# Patient Record
Sex: FEMALE | Race: WHITE | Employment: UNEMPLOYED | URBAN - METROPOLITAN AREA
[De-identification: names, ages, dates, MRNs, and addresses within clinical notes are randomized per-mention and may not be internally consistent; named-entity substitution may affect disease eponyms.]

---
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
## 2021-01-05 RX ORDER — VALACYCLOVIR HYDROCHLORIDE 1 G/1
2000 TABLET, FILM COATED ORAL 2 TIMES DAILY
Qty: 4 TABLET | Refills: 0 | Status: SHIPPED | OUTPATIENT
Start: 2021-01-05 | End: 2021-05-06

## 2021-03-04 LAB
PHQ9 SCORE: 12
PHQ9 SCORE: 12

## 2021-04-20 LAB
PHQ9 SCORE: 9

## 2021-05-06 ENCOUNTER — OFFICE VISIT (OUTPATIENT)
Dept: FAMILY MEDICINE | Facility: CLINIC | Age: 21
End: 2021-05-06
Payer: COMMERCIAL

## 2021-05-06 VITALS
BODY MASS INDEX: 20.88 KG/M2 | WEIGHT: 133 LBS | SYSTOLIC BLOOD PRESSURE: 122 MMHG | DIASTOLIC BLOOD PRESSURE: 64 MMHG | TEMPERATURE: 98.7 F | HEIGHT: 67 IN | HEART RATE: 95 BPM | OXYGEN SATURATION: 98 %

## 2021-05-06 DIAGNOSIS — R10.84 ABDOMINAL PAIN, GENERALIZED: Primary | ICD-10-CM

## 2021-05-06 DIAGNOSIS — L91.0 KELOID SCAR: ICD-10-CM

## 2021-05-06 DIAGNOSIS — F32.1 MODERATE MAJOR DEPRESSION (H): ICD-10-CM

## 2021-05-06 DIAGNOSIS — B00.1 COLD SORE: ICD-10-CM

## 2021-05-06 PROCEDURE — 99214 OFFICE O/P EST MOD 30 MIN: CPT | Performed by: PHYSICIAN ASSISTANT

## 2021-05-06 RX ORDER — VALACYCLOVIR HYDROCHLORIDE 1 G/1
2000 TABLET, FILM COATED ORAL 2 TIMES DAILY
Qty: 4 TABLET | Refills: 3 | Status: SHIPPED | OUTPATIENT
Start: 2021-05-06 | End: 2021-11-02

## 2021-05-06 ASSESSMENT — MIFFLIN-ST. JEOR: SCORE: 1410.91

## 2021-05-06 ASSESSMENT — PAIN SCALES - GENERAL: PAINLEVEL: NO PAIN (0)

## 2021-05-06 NOTE — PATIENT INSTRUCTIONS
To schedule a consult to talk with the stomach doctors about your issues   - call [facility] [phone]

## 2021-05-06 NOTE — PROGRESS NOTES
"    Assessment & Plan     (R10.84) Abdominal pain, generalized  (primary encounter diagnosis)  Comment: referral to GI as this has been a persistent problem for patient with no relief  After conservative measures  Plan: GASTROENTEROLOGY ADULT REF CONSULT ONLY            (B00.1) Cold sore  Comment: would like refill on valtrex  Plan: valACYclovir (VALTREX) 1000 mg tablet            (L91.0) Keloid scar  Comment: no evidence of infection at the earring site but there has been a development of some scarring likely from previous infection  Plan: patient unable to take the earring out - suspect if she did, it would close quickly. Can try to gently massage the area but not sure there is much that can be done at this point in time    (F32.1) Moderate major depression (H)  Comment: worsened recently - per patient, no particular reason why. Denies suicidal ideation. Is followed at [facility] - has not been back in awhile  Plan: patient has appointment with her psychiatrist scheduled within the next 2 weeks      Tobacco Cessation:   reports that she has been smoking other. She has never used smokeless tobacco.    Return in about 3 months (around 8/6/2021) for With specialist.    GILBERTO [facility] MARITZA Newton is a 20 year old who presents for the following health issues  accompanied by her mother:    HPI     Abdominal/Flank Pain  Onset/Duration: 1 month  Description:   Character: Dull ache, \"tight feeling\"   Location: \"Her whole stomach\"   Radiation: None  Intensity: moderate  Progression of Symptoms:  same  Accompanying Signs & Symptoms:  Fever/Chills: no  Gas/Bloating: YES- gas   Nausea: YES  Vomitting: YES- when eating a lot of dairy   Diarrhea: YES- intermittent   Constipation: YES- intermittent   Dysuria or Hematuria: no  History:   Trauma: no  Previous similar pain: no  Previous tests done: none  Precipitating factors:   Does the pain change with:     Food: no    Bowel Movement: " "no    Urination: no   Other factors:  no  Therapies tried and outcome: Mirilax, lactacid   No LMP recorded. (Menstrual status: IUD).      -She has some infected piercing's on both of her ears.      Mood has been not as good lately  No specific triggers or stressful events that she can identify    Review of Systems   Constitutional, HEENT, cardiovascular, pulmonary, gi and gu systems are negative, except as otherwise noted.      Objective    /64   Pulse 95   Temp 98.7  F (37.1  C) (Tympanic)   Ht 1.702 m (5' 7\")   Wt 60.3 kg (133 lb)   SpO2 98%   BMI 20.83 kg/m    Body mass index is 20.83 kg/m .  Physical Exam   GENERAL: healthy, alert and no distress  EARS: piercing in upper ear lobe with thickened, keloid skin around opening. No erythema, no discharge or drainage noted  CV: regular rates and rhythm, normal S1 S2, no S3 or S4 and no murmur, click or rub  ABDOMEN: soft, nontender, without hepatosplenomegaly or masses and bowel sounds normal            "

## 2021-05-12 PROBLEM — F32.1 MODERATE MAJOR DEPRESSION (H): Status: ACTIVE | Noted: 2021-05-12

## 2021-06-09 ENCOUNTER — TRANSFERRED RECORDS (OUTPATIENT)
Dept: HEALTH INFORMATION MANAGEMENT | Facility: CLINIC | Age: 21
End: 2021-06-09
Payer: COMMERCIAL

## 2021-06-28 ENCOUNTER — OFFICE VISIT (OUTPATIENT)
Dept: FAMILY MEDICINE | Facility: CLINIC | Age: 21
End: 2021-06-28
Payer: COMMERCIAL

## 2021-06-28 VITALS
TEMPERATURE: 97.2 F | WEIGHT: 132 LBS | HEART RATE: 102 BPM | OXYGEN SATURATION: 99 % | DIASTOLIC BLOOD PRESSURE: 64 MMHG | HEIGHT: 67 IN | BODY MASS INDEX: 20.72 KG/M2 | SYSTOLIC BLOOD PRESSURE: 112 MMHG

## 2021-06-28 DIAGNOSIS — L91.0 KELOID SCAR: ICD-10-CM

## 2021-06-28 DIAGNOSIS — J34.2 DEVIATED NASAL SEPTUM: Primary | ICD-10-CM

## 2021-06-28 DIAGNOSIS — R06.83 SNORING: ICD-10-CM

## 2021-06-28 PROCEDURE — 99213 OFFICE O/P EST LOW 20 MIN: CPT | Performed by: PHYSICIAN ASSISTANT

## 2021-06-28 ASSESSMENT — PAIN SCALES - GENERAL: PAINLEVEL: NO PAIN (0)

## 2021-06-28 ASSESSMENT — MIFFLIN-ST. JEOR: SCORE: 1406.38
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
## 2021-07-05 ENCOUNTER — OFFICE VISIT (OUTPATIENT)
Dept: OTOLARYNGOLOGY | Facility: CLINIC | Age: 21
End: 2021-07-05
Payer: COMMERCIAL

## 2021-07-05 VITALS
DIASTOLIC BLOOD PRESSURE: 92 MMHG | OXYGEN SATURATION: 96 % | BODY MASS INDEX: 21.18 KG/M2 | HEART RATE: 112 BPM | HEIGHT: 66 IN | WEIGHT: 131.8 LBS | SYSTOLIC BLOOD PRESSURE: 149 MMHG

## 2021-07-05 DIAGNOSIS — J34.89 NASAL OBSTRUCTION: ICD-10-CM

## 2021-07-05 DIAGNOSIS — M95.0 ACQUIRED NASAL DEFORMITY: Primary | ICD-10-CM

## 2021-07-05 DIAGNOSIS — J34.2 ACQUIRED DEVIATED NASAL SEPTUM: ICD-10-CM

## 2021-07-05 PROCEDURE — 99203 OFFICE O/P NEW LOW 30 MIN: CPT | Performed by: OTOLARYNGOLOGY

## 2021-07-05 ASSESSMENT — MIFFLIN-ST. JEOR: SCORE: 1389.59

## 2021-07-29 ENCOUNTER — TRANSFERRED RECORDS (OUTPATIENT)
Dept: HEALTH INFORMATION MANAGEMENT | Facility: CLINIC | Age: 21
End: 2021-07-29

## 2021-08-11 ENCOUNTER — TRANSFERRED RECORDS (OUTPATIENT)
Dept: HEALTH INFORMATION MANAGEMENT | Facility: CLINIC | Age: 21
End: 2021-08-11

## 2021-08-11 ENCOUNTER — OFFICE VISIT (OUTPATIENT)
Dept: FAMILY MEDICINE | Facility: CLINIC | Age: 21
End: 2021-08-11
Payer: COMMERCIAL

## 2021-08-11 VITALS
RESPIRATION RATE: 16 BRPM | SYSTOLIC BLOOD PRESSURE: 120 MMHG | HEART RATE: 87 BPM | DIASTOLIC BLOOD PRESSURE: 76 MMHG | OXYGEN SATURATION: 97 %

## 2021-08-11 DIAGNOSIS — M79.10 MYALGIA: Primary | ICD-10-CM

## 2021-08-11 DIAGNOSIS — R53.83 FATIGUE, UNSPECIFIED TYPE: ICD-10-CM

## 2021-08-11 LAB
ALBUMIN SERPL-MCNC: 4.1 G/DL (ref 3.5–5)
ALP SERPL-CCNC: 114 U/L (ref 45–120)
ALT SERPL W P-5'-P-CCNC: 15 U/L (ref 0–45)
ANION GAP SERPL CALCULATED.3IONS-SCNC: 9 MMOL/L (ref 5–18)
AST SERPL W P-5'-P-CCNC: 20 U/L (ref 0–40)
BASOPHILS # BLD AUTO: 0 10E3/UL (ref 0–0.2)
BASOPHILS NFR BLD AUTO: 0 %
BILIRUB SERPL-MCNC: 0.4 MG/DL (ref 0–1)
BUN SERPL-MCNC: 14 MG/DL (ref 8–22)
CALCIUM SERPL-MCNC: 9.9 MG/DL (ref 8.5–10.5)
CHLORIDE BLD-SCNC: 104 MMOL/L (ref 98–107)
CO2 SERPL-SCNC: 29 MMOL/L (ref 22–31)
CREAT SERPL-MCNC: 0.94 MG/DL (ref 0.6–1.1)
EOSINOPHIL # BLD AUTO: 0 10E3/UL (ref 0–0.7)
EOSINOPHIL NFR BLD AUTO: 0 %
ERYTHROCYTE [DISTWIDTH] IN BLOOD BY AUTOMATED COUNT: 11.5 % (ref 10–15)
GFR SERPL CREATININE-BSD FRML MDRD: 88 ML/MIN/1.73M2
GLUCOSE BLD-MCNC: 101 MG/DL (ref 70–125)
HCT VFR BLD AUTO: 39.4 % (ref 35–47)
HGB BLD-MCNC: 14 G/DL (ref 11.7–15.7)
IMM GRANULOCYTES # BLD: 0 10E3/UL
IMM GRANULOCYTES NFR BLD: 0 %
LYMPHOCYTES # BLD AUTO: 1.6 10E3/UL (ref 0.8–5.3)
LYMPHOCYTES NFR BLD AUTO: 24 %
MCH RBC QN AUTO: 31.2 PG (ref 26.5–33)
MCHC RBC AUTO-ENTMCNC: 35.5 G/DL (ref 31.5–36.5)
MCV RBC AUTO: 88 FL (ref 78–100)
MONOCYTES # BLD AUTO: 0.5 10E3/UL (ref 0–1.3)
MONOCYTES NFR BLD AUTO: 8 %
MONOCYTES NFR BLD AUTO: NEGATIVE %
NEUTROPHILS # BLD AUTO: 4.4 10E3/UL (ref 1.6–8.3)
NEUTROPHILS NFR BLD AUTO: 67 %
PLATELET # BLD AUTO: 241 10E3/UL (ref 150–450)
POTASSIUM BLD-SCNC: 4.4 MMOL/L (ref 3.5–5)
PROT SERPL-MCNC: 6.7 G/DL (ref 6–8)
RBC # BLD AUTO: 4.49 10E6/UL (ref 3.8–5.2)
SODIUM SERPL-SCNC: 142 MMOL/L (ref 136–145)
TSH SERPL DL<=0.005 MIU/L-ACNC: 0.87 UIU/ML (ref 0.3–5)
WBC # BLD AUTO: 6.6 10E3/UL (ref 4–11)

## 2021-08-11 PROCEDURE — U0003 INFECTIOUS AGENT DETECTION BY NUCLEIC ACID (DNA OR RNA); SEVERE ACUTE RESPIRATORY SYNDROME CORONAVIRUS 2 (SARS-COV-2) (CORONAVIRUS DISEASE [COVID-19]), AMPLIFIED PROBE TECHNIQUE, MAKING USE OF HIGH THROUGHPUT TECHNOLOGIES AS DESCRIBED BY CMS-2020-01-R: HCPCS | Performed by: PHYSICIAN ASSISTANT

## 2021-08-11 PROCEDURE — 86618 LYME DISEASE ANTIBODY: CPT | Performed by: PHYSICIAN ASSISTANT

## 2021-08-11 PROCEDURE — 36415 COLL VENOUS BLD VENIPUNCTURE: CPT | Performed by: PHYSICIAN ASSISTANT

## 2021-08-11 PROCEDURE — 86308 HETEROPHILE ANTIBODY SCREEN: CPT | Performed by: PHYSICIAN ASSISTANT

## 2021-08-11 PROCEDURE — 99214 OFFICE O/P EST MOD 30 MIN: CPT | Performed by: PHYSICIAN ASSISTANT

## 2021-08-11 PROCEDURE — U0005 INFEC AGEN DETEC AMPLI PROBE: HCPCS | Performed by: PHYSICIAN ASSISTANT

## 2021-08-11 PROCEDURE — 80050 GENERAL HEALTH PANEL: CPT | Performed by: PHYSICIAN ASSISTANT

## 2021-08-11 RX ORDER — LAMOTRIGINE 25 MG/1
TABLET ORAL
COMMUNITY
Start: 2021-07-20 | End: 2022-03-22

## 2021-08-12 LAB
B BURGDOR IGG+IGM SER QL: 0.19
SARS-COV-2 RNA RESP QL NAA+PROBE: NEGATIVE

## 2021-08-12 NOTE — PATIENT INSTRUCTIONS
Focus on eating well, sleeping well by going to bed at the same time and waking up the same time each night and morning    We will call you if any abnormal results come back with your labs.  Otherwise follow-up with your primary care provider next Tuesday and you can dive into more detail about what could potentially causing your symptoms    I do not see any signs of a bacterial or acute infection at this time based off of exam.

## 2021-08-12 NOTE — PROGRESS NOTES
Chief Complaint   Patient presents with     Generalized Body Aches     [id number], symptoms are getting worse, fatigue, abdominal pain, sensative to light, gets lightheaded when standing up, symptoms started when pt started taking lamotrigine       ASSESSMENT/PLAN:  Lamar was seen today for generalized body aches.    Diagnoses and all orders for this visit:    Myalgia  -     CBC with Platelets & Differential  -     Symptomatic COVID-19 Virus (Coronavirus) by PCR Nasopharyngeal  -     TSH with free T4 reflex  -     Comprehensive metabolic panel  -     Lyme Disease Zuleyma with reflex to WB Serum  -     Mononucleosis screen    Fatigue, unspecified type  -     CBC with Platelets & Differential  -     Symptomatic COVID-19 Virus (Coronavirus) by PCR Nasopharyngeal  -     TSH with free T4 reflex  -     Comprehensive metabolic panel  -     Lyme Disease Zuleyma with reflex to WB Serum  -     Mononucleosis screen    Unclear was causing patient's symptoms.  It could be possibly related to poor diet and sleep habits.  Could be related to the lamotrigine since the symptoms did seem to start after starting this.  She may be more of a depressed state with her bipolar.  Symptoms seem to be worsening over the last week so we will test her for acute infections.  Exam is reassuring along with vitals today.  As of writing this note her blood work looked normal and the Covid test is pending.  She is following up with her PCP next week and they can go further in depth with the work-up and possibly change medications.    Negrito Dong PA-C  30 minutes spent on the date of the encounter doing chart review, history and exam, documentation and further activities per the note    SUBJECTIVE:  Lamar is a 20 year old female who presents to urgent care with concerns of 1 month of fatigue.  She is also noticed over the last week an increase of her fatigue and some generalized body aches, intermittent abdominal pain and some malaise.  She has also noticed  over the last week feeling lightheaded when she stands up but no syncopal episodes.  No vertigo.  Denies any significant headache, ear pain, sore throat, loss of taste or smell, shortness of breath, cough, chest pain, vomiting, urinary symptoms, diarrhea, joint pains or rash.  No known tick bites or bug bites.  She has an IUD and has been having some spotting or one pad level of bleeding for last 2-1/2 weeks.  She says she does not have a consistent sleep pattern and is not the best at sleeping.  States her diet could be better.  No history of thyroid disease in her family.    ROS: Pertinent ROS neg other than the symptoms noted above in the HPI.     OBJECTIVE:  /76   Pulse 87   Resp 16   SpO2 97%    GENERAL: healthy, alert and no distress  EYES: Eyes grossly normal to inspection, PERRL and conjunctivae and sclerae normal  HENT: ear canals and TM's normal, nose and mouth without ulcers or lesions  NECK: no adenopathy, no asymmetry, masses, or scars and thyroid normal to palpation  RESP: lungs clear to auscultation - no rales, rhonchi or wheezes  CV: regular rate and rhythm, normal S1 S2, no S3 or S4, no murmur, click or rub, no peripheral edema and peripheral pulses strong  ABDOMEN: soft, nontender, no hepatosplenomegaly, no masses and bowel sounds normal  MS: no gross musculoskeletal defects noted, no edema  SKIN: no suspicious lesions or rashes  NEURO: Normal strength and tone, mentation intact and speech normal    DIAGNOSTICS    Results for orders placed or performed in visit on 08/11/21   CBC with Platelets & Differential     Status: None    Narrative    The following orders were created for panel order CBC with Platelets & Differential.  Procedure                               Abnormality         Status                     ---------                               -----------         ------                     CBC with platelets and d...[[id number]]                      Final result                 Please  view results for these tests on the individual orders.   TSH with free T4 reflex     Status: Normal   Result Value Ref Range    TSH 0.87 0.30 - 5.00 uIU/mL   Comprehensive metabolic panel     Status: Normal   Result Value Ref Range    Sodium 142 136 - 145 mmol/L    Potassium 4.4 3.5 - 5.0 mmol/L    Chloride 104 98 - 107 mmol/L    Carbon Dioxide (CO2) 29 22 - 31 mmol/L    Anion Gap 9 5 - 18 mmol/L    Urea Nitrogen 14 8 - 22 mg/dL    Creatinine 0.94 0.60 - 1.10 mg/dL    Calcium 9.9 8.5 - 10.5 mg/dL    Glucose 101 70 - 125 mg/dL    Alkaline Phosphatase 114 45 - 120 U/L    AST 20 0 - 40 U/L    ALT 15 0 - 45 U/L    Protein Total 6.7 6.0 - 8.0 g/dL    Albumin 4.1 3.5 - 5.0 g/dL    Bilirubin Total 0.4 0.0 - 1.0 mg/dL    GFR Estimate 88 >60 mL/min/1.73m2   Lyme Disease Zuleyma with reflex to WB Serum     Status: Normal   Result Value Ref Range    Lyme Disease Antibodies Total 0.19 <0.90    Narrative    Interpretation of Lyme Disease Total Antibody (IgG/IgM)  Negative: <0.90 Test Value  No detectable antibodies to B. burgdorferi. Patients in early stages of infection may not produce detectable levels of antibody. Antibiotic therapy in early disease may prevent antibody production from reaching detectable levels. Patients with clinical history and/or symptoms suggestive of Lyme disease but with negative test results should be retested in 2-4 weeks.    Borderline: 0.90-<1.10 Test Value  Suggests the presence of antibodies to B.burgdorferi. Recommend repeat collection in 2-4 weeks.    Positive: >=1.10 Test Value  Indicates the presence of antibodies to B.burgdorferi. False positive results can occur with sera from syphilis patients. Cross-reactivity may occur with relapsing fever, [place] Spotted fever, other spirochetal diseases, erythematosus,EBV infection, or CMV infection. Clinical symptoms,epidemiology of the case and other laboratory tests should allow for distinction of these conditions from Lyme disease.    Mononucleosis screen     Status: Normal   Result Value Ref Range    Mononucleosis Screen Negative Negative   CBC with platelets and differential     Status: None   Result Value Ref Range    WBC Count 6.6 4.0 - 11.0 10e3/uL    RBC Count 4.49 3.80 - 5.20 10e6/uL    Hemoglobin 14.0 11.7 - 15.7 g/dL    Hematocrit 39.4 35.0 - 47.0 %    MCV 88 78 - 100 fL    MCH 31.2 26.5 - 33.0 pg    MCHC 35.5 31.5 - 36.5 g/dL    RDW 11.5 10.0 - 15.0 %    Platelet Count 241 150 - 450 10e3/uL    % Neutrophils 67 %    % Lymphocytes 24 %    % Monocytes 8 %    % Eosinophils 0 %    % Basophils 0 %    % Immature Granulocytes 0 %    Absolute Neutrophils 4.4 1.6 - 8.3 10e3/uL    Absolute Lymphocytes 1.6 0.8 - 5.3 10e3/uL    Absolute Monocytes 0.5 0.0 - 1.3 10e3/uL    Absolute Eosinophils 0.0 0.0 - 0.7 10e3/uL    Absolute Basophils 0.0 0.0 - 0.2 10e3/uL    Absolute Immature Granulocytes 0.0 <=0.0 10e3/uL   Symptomatic COVID-19 Virus (Coronavirus) by PCR Nasopharyngeal     Status: None (In process)    Specimen: Nasopharyngeal; Swab    Narrative    The following orders were created for panel order Symptomatic COVID-19 Virus (Coronavirus) by PCR Nasopharyngeal.  Procedure                               Abnormality         Status                     ---------                               -----------         ------                     SARS-COV2 (COVID-19) Vir...[[id number]]                      In process                   Please view results for these tests on the individual orders.        Current Outpatient Medications   Medication     busPIRone (BUSPAR) 15 MG tablet     gabapentin (NEURONTIN) 100 MG capsule     lamoTRIgine (LAMICTAL) 25 MG tablet     levonorgestrel (MIRENA) 20 MCG/24HR IUD     traZODone (DESYREL) 50 MG tablet     valACYclovir (VALTREX) 1000 mg tablet     venlafaxine (EFFEXOR-XR) 150 MG 24 hr capsule     No current facility-administered medications for this visit.      Patient Active Problem List   Diagnosis     IUD (intrauterine  device) in place     Depression     MARIVEL (generalized anxiety disorder)     Polysubstance abuse (H)     Moderate major depression (H)      No past medical history on file.  No past surgical history on file.  Family History   Problem Relation Age of Onset     Allergies Sister         possible seafood allergy.     Substance Abuse Maternal Grandmother      Substance Abuse Maternal Grandfather      Substance Abuse Paternal Grandmother      Substance Abuse Paternal Grandfather      Family History Negative No family hx of         bleeding disorders     Asthma No family hx of      C.A.D. No family hx of      Diabetes No family hx of      Hypertension No family hx of      Cerebrovascular Disease No family hx of      Breast Cancer No family hx of      Cancer - colorectal No family hx of      Prostate Cancer No family hx of      Social History     Tobacco Use     Smoking status: Current Every Day Smoker     Types: Other     Smokeless tobacco: Never Used     Tobacco comment: currently uses e-cig daily   Substance Use Topics     Alcohol use: Yes     Alcohol/week: 0.0 standard drinks              The plan of care was discussed with the patient. They understand and agree with the course of treatment prescribed. A printed summary was given including instructions and medications.  The use of Dragon/[employer] dictation services may have been used to construct the content in this note; any grammatical or spelling errors are non-intentional. Please contact the author of this note directly if you are in need of any clarification.

## 2021-08-24 ENCOUNTER — OFFICE VISIT (OUTPATIENT)
Dept: FAMILY MEDICINE | Facility: CLINIC | Age: 21
End: 2021-08-24
Payer: COMMERCIAL

## 2021-08-24 VITALS
DIASTOLIC BLOOD PRESSURE: 64 MMHG | WEIGHT: 133 LBS | HEART RATE: 89 BPM | BODY MASS INDEX: 21.38 KG/M2 | TEMPERATURE: 97.4 F | HEIGHT: 66 IN | OXYGEN SATURATION: 99 % | SYSTOLIC BLOOD PRESSURE: 118 MMHG

## 2021-08-24 DIAGNOSIS — R10.13 ABDOMINAL PAIN, EPIGASTRIC: ICD-10-CM

## 2021-08-24 DIAGNOSIS — R11.0 NAUSEA: Primary | ICD-10-CM

## 2021-08-24 PROCEDURE — 36415 COLL VENOUS BLD VENIPUNCTURE: CPT | Performed by: PHYSICIAN ASSISTANT

## 2021-08-24 PROCEDURE — 83516 IMMUNOASSAY NONANTIBODY: CPT | Performed by: PHYSICIAN ASSISTANT

## 2021-08-24 PROCEDURE — 99213 OFFICE O/P EST LOW 20 MIN: CPT | Performed by: PHYSICIAN ASSISTANT

## 2021-08-24 ASSESSMENT — PAIN SCALES - GENERAL: PAINLEVEL: NO PAIN (0)

## 2021-08-24 ASSESSMENT — MIFFLIN-ST. JEOR: SCORE: 1395.03

## 2021-08-24 NOTE — PATIENT INSTRUCTIONS
LAbs today to check for a gluten allergy     I would like you to call and schedule the abdominal US - you can call [phone] to schedule this      While we are working this up, I would like you to start the following medications, both of which are available over the counter    Omeprazole (prilosec) 40mg (2 of the over the counter 20mg tabs or capsules) - take once daily in the morning before breakfast    Famotidine (pepcid) 20mg at bedtime      We are going to try these for the next 2-3 weeks to see if any improvement in your symptoms

## 2021-08-24 NOTE — PROGRESS NOTES
"  Assessment & Plan         ICD-10-CM    1. Nausea  R11.0 US Abdomen Limited     Tissue transglutaminase tisha IgA and IgG     Tissue transglutaminase tisha IgA and IgG   2. Abdominal pain, epigastric  R10.13 US Abdomen Limited       PLANS TODAY:     LAbs today to check for a gluten allergy     I would like you to call and schedule the abdominal US - you can call [phone] to schedule this      While we are working this up, I would like you to start the following medications, both of which are available over the counter    Omeprazole (prilosec) 40mg (2 of the over the counter 20mg tabs or capsules) - take once daily in the morning before breakfast    Famotidine (pepcid) 20mg at bedtime    Tobacco Cessation:   reports that she has been smoking other. She has never used smokeless tobacco.      Return in about 4 weeks (around 9/21/2021) for follow up, sooner if needed.    GILBERTO Martin [facility] MARITZA Newton is a 20 year old who presents for the following health issues     HPI       Patient is here today to discuss lightheadedness with after eating. Her stomach will start to hurt as well as her head. It doesn't matter what she eats it will happen. It has been on and off for 2 weeks.     Had an urgent visit with her psychiatrist because she was worried about side effects she was having from lamictal (new medication)  Ended up stopping the lamictal and she did feel better    Everytime she eats though she is still getting an upset stomach and not feeling well   Hasn't been able to link it to any particular food  No h/o food allergies  No recent illness that she is aware of      Review of Systems   Remainder of ROS obtained and found to be negative other than that which was documented above        Objective    /64   Pulse 89   Temp 97.4  F (36.3  C) (Tympanic)   Ht 1.676 m (5' 6\")   Wt 60.3 kg (133 lb)   SpO2 99%   BMI 21.47 kg/m    Body mass index is 21.47 " kg/m .  Physical Exam   GENERAL: healthy, alert and no distress  RESP: lungs clear to auscultation - no rales, rhonchi or wheezes  CV: regular rates and rhythm, normal S1 S2, no S3 or S4 and no murmur, click or rub  ABDOMEN: soft, nontender and bowel sounds normal  SKIN: no suspicious lesions or rashes    Results for orders placed or performed in visit on 08/24/21   Tissue transglutaminase tisha IgA and IgG     Status: Normal   Result Value Ref Range    Tissue Transglutaminase Antibody IgA 0.2 <7.0 U/mL    Tissue Transglutaminase Antibody IgG <0.6 <7.0 U/mL

## 2021-08-25 LAB
TTG IGA SER-ACNC: 0.2 U/ML
TTG IGG SER-ACNC: <0.6 U/ML

## 2021-08-30 ENCOUNTER — HOSPITAL ENCOUNTER (OUTPATIENT)
Dept: ULTRASOUND IMAGING | Facility: CLINIC | Age: 21
Discharge: HOME | End: 2021-08-30
Attending: PHYSICIAN ASSISTANT | Admitting: PHYSICIAN ASSISTANT
Payer: COMMERCIAL

## 2021-08-30 DIAGNOSIS — R10.13 ABDOMINAL PAIN, EPIGASTRIC: ICD-10-CM

## 2021-08-30 DIAGNOSIS — R11.0 NAUSEA: ICD-10-CM

## 2021-08-30 PROCEDURE — 76705 ECHO EXAM OF ABDOMEN: CPT

## 2021-09-25 ENCOUNTER — HEALTH MAINTENANCE LETTER (OUTPATIENT)
Age: 21
End: 2021-09-25

## 2021-10-19 PROBLEM — F32.9 MAJOR DEPRESSION: Status: ACTIVE | Noted: 2021-05-12

## 2021-10-20 ENCOUNTER — TELEPHONE (OUTPATIENT)
Dept: OTOLARYNGOLOGY | Facility: CLINIC | Age: 21
End: 2021-10-20

## 2021-10-20 ENCOUNTER — PRE VISIT (OUTPATIENT)
Dept: OTOLARYNGOLOGY | Facility: CLINIC | Age: 21
End: 2021-10-20

## 2021-10-20 NOTE — TELEPHONE ENCOUNTER
Writer contacted patient explaining Dr. Pizano was feeling ill today and unfortunately can't make to clinic. Patients appointment changed to 11/17/21 10:20 AM. Writer apologized for any inconvenience.    Yogi Darnell, EMT

## 2021-11-17 ENCOUNTER — OFFICE VISIT (OUTPATIENT)
Dept: OTOLARYNGOLOGY | Facility: CLINIC | Age: 21
End: 2021-11-17
Attending: OTOLARYNGOLOGY
Payer: COMMERCIAL

## 2021-11-17 ENCOUNTER — TRANSFERRED RECORDS (OUTPATIENT)
Dept: HEALTH INFORMATION MANAGEMENT | Facility: CLINIC | Age: 21
End: 2021-11-17

## 2021-11-17 VITALS — BODY MASS INDEX: 21.69 KG/M2 | HEIGHT: 66 IN | WEIGHT: 135 LBS | OXYGEN SATURATION: 99 % | HEART RATE: 92 BPM

## 2021-11-17 DIAGNOSIS — M95.0 ACQUIRED NASAL DEFORMITY: ICD-10-CM

## 2021-11-17 DIAGNOSIS — J34.89 NASAL OBSTRUCTION: Primary | ICD-10-CM

## 2021-11-17 DIAGNOSIS — J34.2 DEVIATED NASAL SEPTUM: ICD-10-CM

## 2021-11-17 DIAGNOSIS — J34.3 HYPERTROPHY OF INFERIOR NASAL TURBINATE: ICD-10-CM

## 2021-11-17 DIAGNOSIS — S02.2XXA CLOSED FRACTURE OF NASAL BONE, INITIAL ENCOUNTER: ICD-10-CM

## 2021-11-17 DIAGNOSIS — Z87.81 HISTORY OF FRACTURE OF NASAL BONE: ICD-10-CM

## 2021-11-17 DIAGNOSIS — J34.3 HYPERTROPHY OF BOTH INFERIOR NASAL TURBINATES: ICD-10-CM

## 2021-11-17 PROCEDURE — 31231 NASAL ENDOSCOPY DX: CPT | Performed by: OTOLARYNGOLOGY

## 2021-11-17 PROCEDURE — 99203 OFFICE O/P NEW LOW 30 MIN: CPT | Mod: 25 | Performed by: OTOLARYNGOLOGY

## 2021-11-17 RX ORDER — CEFAZOLIN SODIUM 2 G/50ML
2 SOLUTION INTRAVENOUS
Status: CANCELLED | OUTPATIENT
Start: 2021-11-17

## 2021-11-17 RX ORDER — CEFAZOLIN SODIUM 2 G/50ML
2 SOLUTION INTRAVENOUS SEE ADMIN INSTRUCTIONS
Status: CANCELLED | OUTPATIENT
Start: 2021-11-17

## 2021-11-17 ASSESSMENT — MIFFLIN-ST. JEOR: SCORE: 1399.11

## 2021-11-17 ASSESSMENT — PAIN SCALES - GENERAL: PAINLEVEL: NO PAIN (0)

## 2021-11-17 NOTE — PROGRESS NOTES
Facial Plastic and Reconstructive Surgery Consultation    Referring Provider:   Sonido Gonzalez MD  [address] [facility] [place] [address]    HPI:   I had the pleasure of seeing Lamar Rosas today in clinic for consultation for nasal obstruction and nasal deformity after trauma. Lamar Rosas is a 21 year old female with a history of nasal trauma. She most recently hit her nose on a diving board with a resulting significant nose bleed and nasal obstruction. She was not evaluated at the time but has since seen Dr. Gonzalez who noted a nasal deformity including the nasal bones and and a significantly deviated nasal septum. Her nose is also quite visibly shifted to the left.     She has constant trouble with her breathing and this is not improved by anything she has tried. She has trouble when she exerts herself and when she is trying to sleep at night.     Review Of Systems  ROS: 10 point ROS neg other than the symptoms noted above in the HPI.    Patient Active Problem List   Diagnosis     IUD (intrauterine device) in place     Depression     MARIVEL (generalized anxiety disorder)     Polysubstance abuse (H)     Moderate major depression (H)     No past surgical history on file.  Current Outpatient Medications   Medication Sig Dispense Refill     gabapentin (NEURONTIN) 100 MG capsule TAKE 1 CAPSULE 3 TIMES DAILY AS NEEDED FOR ANXIETY/PANIC       levonorgestrel (MIRENA) 20 MCG/24HR IUD 1 each by Intrauterine route once       traZODone (DESYREL) 50 MG tablet Take 2 tablets (100 mg) by mouth At Bedtime 60 tablet 0     valACYclovir (VALTREX) 1000 mg tablet Take 2 tablets (2,000 mg) by mouth 2 times daily 4 tablet 0     venlafaxine (EFFEXOR-XR) 150 MG 24 hr capsule TAKE 1 CAPSULE BY MOUTH EVERY DAY       busPIRone (BUSPAR) 15 MG tablet Take 1 tablet (15 mg) by mouth 2 times daily 120 tablet 0     lamoTRIgine (LAMICTAL) 25 MG tablet take one tablet EVERY DAY for 14 days, THEN TWO EVERY DAY for 14 days THEN take 4 tablets  (Patient not taking: Reported on 8/24/2021)       Seasonal allergies  Social History     Socioeconomic History     Marital status: Single     Spouse name: Not on file     Number of children: Not on file     Years of education: Not on file     Highest education level: Not on file   Occupational History     Not on file   Tobacco Use     Smoking status: Current Every Day Smoker     Types: Other     Smokeless tobacco: Never Used     Tobacco comment: currently uses e-cig daily   Substance and Sexual Activity     Alcohol use: Yes     Alcohol/week: 0.0 standard drinks     Drug use: Not Currently     Types: Marijuana, Benzodiazepines     Comment: 1 xanax every two weeks     Sexual activity: Yes     Partners: Male     Birth control/protection: I.U.D.   Other Topics Concern     Not on file   Social History Narrative    Lives at home with mom and her Fiance, and older siblings. Fiance smokes.  One dog.      Social Determinants of Health     Financial Resource Strain: Not on file   Food Insecurity: Not on file   Transportation Needs: Not on file   Physical Activity: Not on file   Stress: Not on file   Social Connections: Not on file   Intimate Partner Violence: Not on file   Housing Stability: Not on file     Family History   Problem Relation Age of Onset     Allergies Sister         possible seafood allergy.     Substance Abuse Maternal Grandmother      Substance Abuse Maternal Grandfather      Substance Abuse Paternal Grandmother      Substance Abuse Paternal Grandfather      Family History Negative No family hx of         bleeding disorders     Asthma No family hx of      C.A.D. No family hx of      Diabetes No family hx of      Hypertension No family hx of      Cerebrovascular Disease No family hx of      Breast Cancer No family hx of      Cancer - colorectal No family hx of      Prostate Cancer No family hx of        PE:  Alert and Oriented, Answering Questions Appropriately  Atraumatic, Normocephalic, Face  Symmetric  Skin: Davidson 2  Facial Nerve Intact and facial movement symmetric  EOM's, PEERL  Nasal Exam: Notable external deformity with left hawkins shift with nasal bones and midvault shift to the left, nasal bone deformity from fracture on the right, two nasal piercings on the right ala, one on the left ala, anterior rhinoscopy with septum completely deviated to the right in contact with the inferior turbinate on the right, Left inferior turbinate hypertrophy, hard no note posteriorly on the septum what it looks like and thus endoscopy was performed. no mucopurulence or polyps, no masses  Chin: Normal   Lips/Teeth/Toungue/Gums: Lips intact, Normal Dentition, Occlusion intact, Oral mucosa intact, no lesions/ulcerations/masses, Tongue mobile  Neck: No lymphadenopathy, no thyromegaly, trachea midline  Chest: No wheezing, cyanosis, or stridor  Card: Normal upper extremity pulses and capillary refill, not diaphoretic  Neuro/Psych: CN's 2-12 intact, Moves all extremities, ambulation in intact, positive affect, no notable muscle weakness          PROCEDURE: rigid nasal endoscopy, diagnostic   Indication: history of significant nasal trauma with limited exam through rhinoscopy due to severe septal deviation  Performed by: Dacia Pizano MD      Nasal Endoscopy is performed to provide a more thorough evaluation of the nasal airway than seen on standard nasal speculum exam.  Findings are as follows:   Nasal passages:  Right constricted, was able to pass past caudal septum but not beyond, there is not space between the fractured septum and the inferior turbinate on the right. On the left there is compensatory hypertrophy of the inferior turbinate and the septum is shifted away, I can scope to the posterior septum and note the perpendicular plate is also offset to the right. There should be enough quadrangular cartilage for grafting.    Left Middle Meatus: clear, no mucopurulence bilaterally, no polyps   Left  Nasopharynx: clear, no lesions, no crusting or inflammation      IMPRESSION:    Nasal fracture  Acquired nasal deformity  Nasal obstruction  Septal deviation        PLAN:    Lamar Rosas presents today for consultation for nasal obstruction. She is significantly debilitated by the inability to effectively move air through her nose. There are visible structural deficits that would not be improved with medical therapy. The noted deformities on exam require surgical correction. These would not be addressed or corrected with a septoplasty alone, as the structural deficits arise in the dorsal L strut supportive aspect of the septum. Noted deformities on exam result from significant trauma leading to external and internal deformities affecting the form and function of the nose. The findings are also resultant from the acquired deformity of the nasal bones from the fracture.     She will need medial and lateral osteotomies to mobilize the bony nasal vault. She will need spreaders to support the nasal framework and internal nasal valves. She will need to reinforce the caudal septum.     The surgery was discussed thoroughly and questions were answered today.       Photodocumentation was obtained.     I spent a total of 35 minutes in the care of patient Lamar Rosas during today's office visit. This time includes reviewing the patient's chart and prior history, obtaining a history, performing an examination and evaluation and counseling the patient. This time also includes ordering mediations or tests necessary in addition to communication to other member's of the patient's health care team. Time spent in documentation and care coordination is included.

## 2021-11-17 NOTE — LETTER
11/17/2021       RE: Lamar Rosas  [address]  [facility] [address]     Dear Colleague,    Thank you for referring your patient, Lamar Rosas, to the [facility] [place] [facility]. Please see a copy of my visit note below.    Facial Plastic and Reconstructive Surgery Consultation    Referring Provider:   Sonido Gonzalez MD  [address] [place] [address]    HPI:   I had the pleasure of seeing Lamar Rosas today in clinic for consultation for nasal obstruction and nasal deformity after trauma. Lamar Rosas is a 21 year old female with a history of nasal trauma. She most recently hit her nose on a diving board with a resulting significant nose bleed and nasal obstruction. She was not evaluated at the time but has since seen Dr. Gonzalez who noted a nasal deformity including the nasal bones and and a significantly deviated nasal septum. Her nose is also quite visibly shifted to the left.     She has constant trouble with her breathing and this is not improved by anything she has tried. She has trouble when she exerts herself and when she is trying to sleep at night.     Review Of Systems  ROS: 10 point ROS neg other than the symptoms noted above in the HPI.    Patient Active Problem List   Diagnosis     IUD (intrauterine device) in place     Depression     MARIVEL (generalized anxiety disorder)     Polysubstance abuse (H)     Moderate major depression (H)     No past surgical history on file.  Current Outpatient Medications   Medication Sig Dispense Refill     gabapentin (NEURONTIN) 100 MG capsule TAKE 1 CAPSULE 3 TIMES DAILY AS NEEDED FOR ANXIETY/PANIC       levonorgestrel (MIRENA) 20 MCG/24HR IUD 1 each by Intrauterine route once       traZODone (DESYREL) 50 MG tablet Take 2 tablets (100 mg) by mouth At Bedtime 60 tablet 0     valACYclovir (VALTREX) 1000 mg tablet Take 2 tablets (2,000 mg) by mouth 2 times  daily 4 tablet 0     venlafaxine (EFFEXOR-XR) 150 MG 24 hr capsule TAKE 1 CAPSULE BY MOUTH EVERY DAY       busPIRone (BUSPAR) 15 MG tablet Take 1 tablet (15 mg) by mouth 2 times daily 120 tablet 0     lamoTRIgine (LAMICTAL) 25 MG tablet take one tablet EVERY DAY for 14 days, THEN TWO EVERY DAY for 14 days THEN take 4 tablets (Patient not taking: Reported on 8/24/2021)       Seasonal allergies  Social History     Socioeconomic History     Marital status: Single     Spouse name: Not on file     Number of children: Not on file     Years of education: Not on file     Highest education level: Not on file   Occupational History     Not on file   Tobacco Use     Smoking status: Current Every Day Smoker     Types: Other     Smokeless tobacco: Never Used     Tobacco comment: currently uses e-cig daily   Substance and Sexual Activity     Alcohol use: Yes     Alcohol/week: 0.0 standard drinks     Drug use: Not Currently     Types: Marijuana, Benzodiazepines     Comment: 1 xanax every two weeks     Sexual activity: Yes     Partners: Male     Birth control/protection: I.U.D.   Other Topics Concern     Not on file   Social History Narrative    Lives at home with mom and her Fiance, and older siblings. Fiance smokes.  One dog.      Social Determinants of Health     Financial Resource Strain: Not on file   Food Insecurity: Not on file   Transportation Needs: Not on file   Physical Activity: Not on file   Stress: Not on file   Social Connections: Not on file   Intimate Partner Violence: Not on file   Housing Stability: Not on file     Family History   Problem Relation Age of Onset     Allergies Sister         possible seafood allergy.     Substance Abuse Maternal Grandmother      Substance Abuse Maternal Grandfather      Substance Abuse Paternal Grandmother      Substance Abuse Paternal Grandfather      Family History Negative No family hx of         bleeding disorders     Asthma No family hx of      C.A.D. No family hx of       Diabetes No family hx of      Hypertension No family hx of      Cerebrovascular Disease No family hx of      Breast Cancer No family hx of      Cancer - colorectal No family hx of      Prostate Cancer No family hx of        PE:  Alert and Oriented, Answering Questions Appropriately  Atraumatic, Normocephalic, Face Symmetric  Skin: Davidson 2  Facial Nerve Intact and facial movement symmetric  EOM's, PEERL  Nasal Exam: Notable external deformity with left hawkins shift with nasal bones and midvault shift to the left, nasal bone deformity from fracture on the right, two nasal piercings on the right ala, one on the left ala, anterior rhinoscopy with septum completely deviated to the right in contact with the inferior turbinate on the right, Left inferior turbinate hypertrophy, hard no note posteriorly on the septum what it looks like and thus endoscopy was performed. no mucopurulence or polyps, no masses  Chin: Normal   Lips/Teeth/Toungue/Gums: Lips intact, Normal Dentition, Occlusion intact, Oral mucosa intact, no lesions/ulcerations/masses, Tongue mobile  Neck: No lymphadenopathy, no thyromegaly, trachea midline  Chest: No wheezing, cyanosis, or stridor  Card: Normal upper extremity pulses and capillary refill, not diaphoretic  Neuro/Psych: CN's 2-12 intact, Moves all extremities, ambulation in intact, positive affect, no notable muscle weakness          PROCEDURE: rigid nasal endoscopy, diagnostic   Indication: history of significant nasal trauma with limited exam through rhinoscopy due to severe septal deviation  Performed by: Dacia Pizano MD      Nasal Endoscopy is performed to provide a more thorough evaluation of the nasal airway than seen on standard nasal speculum exam.  Findings are as follows:   Nasal passages:  Right constricted, was able to pass past caudal septum but not beyond, there is not space between the fractured septum and the inferior turbinate on the right. On the left there is compensatory  hypertrophy of the inferior turbinate and the septum is shifted away, I can scope to the posterior septum and note the perpendicular plate is also offset to the right. There should be enough quadrangular cartilage for grafting.    Left Middle Meatus: clear, no mucopurulence bilaterally, no polyps   Left Nasopharynx: clear, no lesions, no crusting or inflammation      IMPRESSION:    Nasal fracture  Acquired nasal deformity  Nasal obstruction  Septal deviation        PLAN:    Lamar Rosas presents today for consultation for nasal obstruction. She is significantly debilitated by the inability to effectively move air through her nose. There are visible structural deficits that would not be improved with medical therapy. The noted deformities on exam require surgical correction. These would not be addressed or corrected with a septoplasty alone, as the structural deficits arise in the dorsal L strut supportive aspect of the septum. Noted deformities on exam result from significant trauma leading to external and internal deformities affecting the form and function of the nose. The findings are also resultant from the acquired deformity of the nasal bones from the fracture.     She will need medial and lateral osteotomies to mobilize the bony nasal vault. She will need spreaders to support the nasal framework and internal nasal valves. She will need to reinforce the caudal septum.     The surgery was discussed thoroughly and questions were answered today.       Photodocumentation was obtained.     I spent a total of 35 minutes in the care of patient Lamar Rosas during today's office visit. This time includes reviewing the patient's chart and prior history, obtaining a history, performing an examination and evaluation and counseling the patient. This time also includes ordering mediations or tests necessary in addition to communication to other member's of the patient's health care team. Time spent in documentation and  care coordination is included.       Again, thank you for allowing me to participate in the care of your patient.      Sincerely,    Dacia Pizano MD

## 2021-11-17 NOTE — NURSING NOTE
Photodocumentation obtained.    Completed nasal surgery education with pt and gave written materials on what to expect post-op, as well as a list of medications to avoid prior to surgery.    Will work to obtain PA for Septorhinoplasty with Bilateral Turbinate Reduction.    Nyasia Rosales RN  11/17/2021 4:07 PM

## 2021-11-17 NOTE — NURSING NOTE
"Chief Complaint   Patient presents with     Consult     Acquired nasal deformity       Pulse 92, height 1.676 m (5' 6\"), weight 61.2 kg (135 lb), SpO2 99 %, not currently breastfeeding.    Brittany Ashley, EMT    "

## 2021-11-18 ENCOUNTER — HOSPITAL ENCOUNTER (OUTPATIENT)
Facility: AMBULATORY SURGERY CENTER | Age: 21
End: 2021-11-18
Attending: OTOLARYNGOLOGY
Payer: COMMERCIAL

## 2021-11-18 DIAGNOSIS — Z87.81 HISTORY OF FRACTURE OF NASAL BONE: ICD-10-CM

## 2021-11-18 DIAGNOSIS — M95.0 ACQUIRED NASAL DEFORMITY: ICD-10-CM

## 2021-12-02 ENCOUNTER — TELEPHONE (OUTPATIENT)
Dept: OTOLARYNGOLOGY | Facility: CLINIC | Age: 21
End: 2021-12-02
Payer: COMMERCIAL

## 2021-12-14 NOTE — TELEPHONE ENCOUNTER
Spoke to patient regarding scheduling surgery with Dr. Jhoana Cesar. Offered her first available at the [facility]. Additionally, offered scheduling at [facility]. Patient would like to move forward with soonest available surgical date of 3/1/2022 at [facility].   Informed patient writer will send information via mail regarding surgical center location and what is needed prior to scheduling surgery. (I.e. pre-op and covid-19 test) will send via mail to patients address.     Patient had no further questions.    Surgical orders faxed 12/08/2021 to [facility]   Message sent to [employer] to update authorization location to include [facility] for auth.     Pre Auth/Referral # [id number]   Auth Date Range:                   11/18/2021 - 11/17/2022

## 2022-01-15 ENCOUNTER — HEALTH MAINTENANCE LETTER (OUTPATIENT)
Age: 22
End: 2022-01-15
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
## 2022-02-19 ENCOUNTER — HOSPITAL ENCOUNTER (EMERGENCY)
Facility: CLINIC | Age: 22
Discharge: HOME | End: 2022-02-20
Attending: EMERGENCY MEDICINE | Admitting: EMERGENCY MEDICINE
Payer: COMMERCIAL

## 2022-02-19 DIAGNOSIS — F10.920 ALCOHOLIC INTOXICATION WITHOUT COMPLICATION (H): ICD-10-CM

## 2022-02-19 LAB
ETHANOL SERPL-MCNC: 0.29 G/DL
HCG SERPL QL: NEGATIVE

## 2022-02-19 PROCEDURE — 84703 CHORIONIC GONADOTROPIN ASSAY: CPT | Performed by: EMERGENCY MEDICINE

## 2022-02-19 PROCEDURE — 99283 EMERGENCY DEPT VISIT LOW MDM: CPT

## 2022-02-19 PROCEDURE — 36415 COLL VENOUS BLD VENIPUNCTURE: CPT | Performed by: EMERGENCY MEDICINE

## 2022-02-19 PROCEDURE — 82077 ASSAY SPEC XCP UR&BREATH IA: CPT | Performed by: EMERGENCY MEDICINE

## 2022-02-20 VITALS
HEART RATE: 94 BPM | DIASTOLIC BLOOD PRESSURE: 65 MMHG | TEMPERATURE: 98.7 F | SYSTOLIC BLOOD PRESSURE: 109 MMHG | OXYGEN SATURATION: 95 % | RESPIRATION RATE: 14 BRPM

## 2022-02-20 NOTE — ED NOTES
Pt boyfriend arrived at ED looking for pt. Jerrod Olivas [phone]. He is going to wait for her since they live 90 minutes away.

## 2022-02-20 NOTE — DISCHARGE INSTRUCTIONS

## 2022-02-20 NOTE — ED NOTES
Bed: [facility]  Expected date: 2/19/22  Expected time: 8:02 PM  Means of arrival: Ambulance  Comments:  Gregory 531 21F intox., drug use; combative, restrained/sedated
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
## 2022-02-20 NOTE — ED PROVIDER NOTES
Signed out by Dr. Marcos.  In brief was intoxicated at the airport.  Has not cleared sufficiently to be ambulatory.  Boyfriend can take home when mental status improves.    Reassessed at 4:20am.  Patient ambulatory.  Denies worsening depression/anxiety and has no suicidal thoughts.  No recent illness, injuries, or pain.  Was on her way to see her sister in [place], here is home.  Appropriate for discharge with her sober ride.       Ashanti Lockhart MD  02/20/22 0807

## 2022-02-24 ENCOUNTER — TELEPHONE (OUTPATIENT)
Dept: OTOLARYNGOLOGY | Facility: CLINIC | Age: 22
End: 2022-02-24
Payer: COMMERCIAL

## 2022-02-24 NOTE — TELEPHONE ENCOUNTER
Pt called to reschedule surgery with Dr. Jhoana Cesar. Offered patient April and May. Patient scheduled for 6/7/2022. Knows pre-op wtihin 30 days and covid-19 test within 4.Scheduled at [facility] 6/7/2022. Post op cancelled.     Maribel Urena on 2/24/2022 at 2:46 PM

## 2022-03-22 ENCOUNTER — VIRTUAL VISIT (OUTPATIENT)
Dept: FAMILY MEDICINE | Facility: CLINIC | Age: 22
End: 2022-03-22
Payer: COMMERCIAL

## 2022-03-22 DIAGNOSIS — F90.9 ATTENTION DEFICIT HYPERACTIVITY DISORDER (ADHD), UNSPECIFIED ADHD TYPE: Primary | ICD-10-CM

## 2022-03-22 PROCEDURE — 99213 OFFICE O/P EST LOW 20 MIN: CPT | Mod: TEL | Performed by: NURSE PRACTITIONER

## 2022-03-22 RX ORDER — TRAZODONE HYDROCHLORIDE 100 MG/1
TABLET ORAL
COMMUNITY
Start: 2022-02-18

## 2022-03-22 RX ORDER — GABAPENTIN 300 MG/1
CAPSULE ORAL
COMMUNITY
Start: 2022-03-02

## 2022-03-22 RX ORDER — LISDEXAMFETAMINE DIMESYLATE 10 MG/1
CAPSULE ORAL
COMMUNITY
Start: 2022-02-28 | End: 2022-03-22

## 2022-03-22 RX ORDER — LISDEXAMFETAMINE DIMESYLATE 10 MG/1
CAPSULE ORAL
Qty: 30 CAPSULE | Refills: 0 | Status: SHIPPED | OUTPATIENT
Start: 2022-03-22 | End: 2022-12-27

## 2022-03-22 ASSESSMENT — ANXIETY QUESTIONNAIRES
6. BECOMING EASILY ANNOYED OR IRRITABLE: NEARLY EVERY DAY
1. FEELING NERVOUS, ANXIOUS, OR ON EDGE: MORE THAN HALF THE DAYS
GAD7 TOTAL SCORE: 16
2. NOT BEING ABLE TO STOP OR CONTROL WORRYING: MORE THAN HALF THE DAYS
IF YOU CHECKED OFF ANY PROBLEMS ON THIS QUESTIONNAIRE, HOW DIFFICULT HAVE THESE PROBLEMS MADE IT FOR YOU TO DO YOUR WORK, TAKE CARE OF THINGS AT HOME, OR GET ALONG WITH OTHER PEOPLE: SOMEWHAT DIFFICULT
5. BEING SO RESTLESS THAT IT IS HARD TO SIT STILL: NEARLY EVERY DAY
3. WORRYING TOO MUCH ABOUT DIFFERENT THINGS: MORE THAN HALF THE DAYS
7. FEELING AFRAID AS IF SOMETHING AWFUL MIGHT HAPPEN: SEVERAL DAYS

## 2022-03-22 ASSESSMENT — PATIENT HEALTH QUESTIONNAIRE - PHQ9
SUM OF ALL RESPONSES TO PHQ QUESTIONS 1-9: 6
5. POOR APPETITE OR OVEREATING: NEARLY EVERY DAY

## 2022-03-22 NOTE — PATIENT INSTRUCTIONS
Discussed that she needs to contact her Psychiatrist and discuss returning to them for continued prescribing and I would not be continuing to prescribe Vyvanse given history   Patient stated understanding and will contact Psychiatry.  Discussed no alcohol use with stimulant    JEANCARLOS Peoples

## 2022-03-22 NOTE — PROGRESS NOTES
Lamar is a 21 year old who is being evaluated via a billable  Telephone visit.       Chief Complaint   Patient presents with     Medication Request     vyvanse refill .[facility] prescibed medication but will not refill medication     How would you like to obtain your AVS? [place]  If the video visit is dropped, the invitation should be resent by: Text to cell phone: [phone]      Assessment & Plan     Attention deficit hyperactivity disorder (ADHD), unspecified ADHD type  Discussed my concern given recent alcohol intoxication episode from ED.  Patient states this was an isolated incident.  Discussed that she needs to contact her Psychiatrist and discuss returning to them for continued prescribing and I would not be continuing to prescribe Vyvanse given history   Patient stated understanding and will contact Psychiatry.  Discussed no alcohol use with stimulant.    - VYVANSE 10 MG capsule  Dispense: 30 capsule; Refill: 0        Tobacco Cessation:   reports that she has been smoking other. She has never used smokeless tobacco.  Tobacco Cessation Action Plan: Information offered: Patient not interested at this time    See Patient Instructions    No follow-ups on file.    Arpita Edouard NP  [facility]   Lamar is a 21 year old who presents for the following health issues     History of Present Illness       Reason for visit:  Refil on medications  Symptom onset:  More than a month    She eats 0-1 servings of fruits and vegetables daily.She consumes 1 sweetened beverage(s) daily.She exercises with enough effort to increase her heart rate 9 or less minutes per day.  She exercises with enough effort to increase her heart rate 4 days per week.   She is taking medications regularly.     Had appointment with [facility] - claims missed appointment.  Was marked as no show.    Still is taking anti depressants but now can no longer get these medications.    Gabapentin 300 mg  three times daily, Venlafaxine 150 mg once daily, Trazodone 100 mg once at bedtime, Buspirone 15 mg twice daily.    Depression and Anxiety Follow-Up    How are you doing with your depression since your last visit? Improved with  medication    How are you doing with your anxiety since your last visit?  Improved with medication    Are you having other symptoms that might be associated with depression or anxiety? No    Have you had a significant life event? No     Do you have any concerns with your use of alcohol or other drugs? No     Was in ED 2/19 for alcohol intoxication - reported benzodiazapine use as well.  EMS [place] prescription Trazodone [place]  Was unresponsive brought in by EMS  History of polysubstance use.    Reports alcohol use - weekly - 1-2 per time.  NO drug use.    Social History     Tobacco Use     Smoking status: Current Every Day Smoker     Types: Other     Smokeless tobacco: Never Used     Tobacco comment: currently uses e-cig daily   Vaping Use     Vaping Use: Every day   Substance Use Topics     Alcohol use: Yes     Alcohol/week: 0.0 standard drinks     Comment: social     Drug use: Not Currently     Types: Marijuana, Benzodiazepines     Comment: 1 xanax every two weeks     PHQ 3/6/2020 4/29/2020 12/4/2020   PHQ-9 Total Score 11 6 6   Q9: Thoughts of better off dead/self-harm past 2 weeks Not at all Not at all Not at all   Some encounter information is confidential and restricted. Go to Review Flowsheets activity to see all data.     MARIVEL-7 SCORE 3/6/2020 4/29/2020 12/4/2020   Total Score 21 16 16     Last PHQ-9 3/22/2022   1.  Little interest or pleasure in doing things 1   2.  Feeling down, depressed, or hopeless 0   3.  Trouble falling or staying asleep, or sleeping too much 1   4.  Feeling tired or having little energy 0   5.  Poor appetite or overeating 0   6.  Feeling bad about yourself 0   7.  Trouble concentrating 3   8.  Moving slowly or restless 1   Q9: Thoughts of  "better off dead/self-harm past 2 weeks 0   PHQ-9 Total Score 6   Difficulty at work, home, or with people Somewhat difficult   Some encounter information is confidential and restricted. Go to Review Flowsheets activity to see all data.     MARIVEL-7  3/22/2022   1. Feeling nervous, anxious, or on edge 2   2. Not being able to stop or control worrying 2   3. Worrying too much about different things 2   4. Trouble relaxing 3   5. Being so restless that it is hard to sit still 3   6. Becoming easily annoyed or irritable 3   7. Feeling afraid, as if something awful might happen 1   MARIVEL-7 Total Score 16   If you checked any problems, how difficult have they made it for you to do your work, take care of things at home, or get along with other people? Somewhat difficult       Suicide Assessment Five-step Evaluation and Treatment (SAFE-T)       Review of Systems   Constitutional, HEENT, cardiovascular, pulmonary, GI, , musculoskeletal, neuro, skin, endocrine and psych systems are negative, except as otherwise noted.      Objective    Vitals - Patient Reported  Weight (Patient Reported): 59 kg (130 lb)  Height (Patient Reported): 167.6 cm (5' 6\")  BMI (Based on Pt Reported Ht/Wt): 20.98  Pain Score: No Pain (0)        Physical Exam   GENERAL: Healthy, alert and no distress  EYES: Eyes grossly normal to inspection.  No discharge or erythema, or obvious scleral/conjunctival abnormalities.  RESP: No audible wheeze, cough, or visible cyanosis.  No visible retractions or increased work of breathing.    SKIN: Visible skin clear. No significant rash, abnormal pigmentation or lesions.  NEURO: Cranial nerves grossly intact.  Mentation and speech appropriate for age.  PSYCH: Mentation appears normal, affect normal/bright, judgement and insight intact, normal speech and appearance well-groomed.      Telephone visit 11 minutes  "

## 2022-03-23 ASSESSMENT — ANXIETY QUESTIONNAIRES: GAD7 TOTAL SCORE: 16

## 2022-04-19 ENCOUNTER — VIRTUAL VISIT (OUTPATIENT)
Dept: FAMILY MEDICINE | Facility: CLINIC | Age: 22
End: 2022-04-19
Payer: COMMERCIAL

## 2022-04-19 DIAGNOSIS — F19.10 POLYSUBSTANCE ABUSE (H): ICD-10-CM

## 2022-04-19 DIAGNOSIS — F41.1 GAD (GENERALIZED ANXIETY DISORDER): ICD-10-CM

## 2022-04-19 DIAGNOSIS — F33.1 MODERATE EPISODE OF RECURRENT MAJOR DEPRESSIVE DISORDER (H): ICD-10-CM

## 2022-04-19 DIAGNOSIS — F90.2 ATTENTION DEFICIT HYPERACTIVITY DISORDER (ADHD), COMBINED TYPE: Primary | ICD-10-CM

## 2022-04-19 PROCEDURE — 99213 OFFICE O/P EST LOW 20 MIN: CPT | Mod: GT | Performed by: PHYSICIAN ASSISTANT

## 2022-04-19 NOTE — PROGRESS NOTES
Lamar is a 21 year old who is being evaluated via a billable video visit.      How would you like to obtain your AVS? MyChart  If the video visit is dropped, the invitation should be resent by: Call: [phone]  Will anyone else be joining your video visit? No    Video Start Time: 3:37 PM    Assessment & Plan     (F90.2) Attention deficit hyperactivity disorder (ADHD), combined type  (primary encounter diagnosis)  Comment: previously on vyvanse per kieran. Last clinic follow up (per scanned notes 11/2021) although patient notes she was having to go monthly. Per  last refill was on 2/28/22 but was only for qty 11.   Discussed that I do have concerns given her recent ED visit for alcohol intoxication although do not see notes or evidence that she was also taking benzodiazepines as stated at a later follow up and patient states that she has not been using those.   Set stipulations that I will not consider prescribing vyvanse until she is able to leave a urine for urine drug screen and will only prescribe it then if negative and she has a new appointment with psychiatry established. She understands this and will schedule a lab only visit for the urine test  Plan:  Drug Abuse Screen Panel 13, Urine (Pain Care         Package) - lab collect, Adult Mental Health          Referral    (F19.10) Polysubstance abuse (H)  Comment: history of - patient denies recent abuse other than alcohol  Plan: Drug Abuse Screen Panel 13, Urine (Pain Care         Package) - lab collect, Adult Mental Health          Referral      (F41.1) MARIVEL (generalized anxiety disorder)  Comment:   Plan: Adult Mental Health  Referral            (F33.1) Moderate episode of recurrent major depressive disorder (H)  Comment:   Plan: Adult Mental Health  Referral              Return in about 2 weeks (around 5/3/2022) for Lab Work (urine tox screen).    Lety Bowers PA-C  [facility]  "  Lamar is a 21 year old who presents for the following health issues     HPI     Medication Followup of Vyvanse 10 mg capsule     Taking Medication as prescribed: yes    Side Effects:  None    Medication Helping Symptoms:  yes     -She is not able to get in with her psych doctor as she was told her psych doctor will no longer see her. So she wants her medication refilled until she can find a new doctor to see.      Was being followed by [facility] - says she tried to call and schedule an appointment but was told she could not see her previous provider as she had \"no showed\" appointments.   She states she doesn't remember these appointments being made so did not intentionally miss them    Was seen by another provider who had not seen patient before  Understandably, did not want to prescribe vyvanse given the history   Did mention in that note of recent benzo use noted in ED but in review of ED note this was not meant to be a \"current\" use and there was not test done to confirm this  Patient denies recent use      Review of Systems   Remainder of ROS obtained and found to be negative other than that which was documented above        Objective           Vitals:  No vitals were obtained today due to virtual visit.    Physical Exam   GENERAL: Healthy, alert and no distress  EYES: Eyes grossly normal to inspection.  No discharge or erythema, or obvious scleral/conjunctival abnormalities.  RESP: No audible wheeze, cough, or visible cyanosis.  No visible retractions or increased work of breathing.    SKIN: Visible skin clear. No significant rash, abnormal pigmentation or lesions.  NEURO: Cranial nerves grossly intact.  Mentation and speech appropriate for age.  PSYCH: Mentation appears normal, affect normal/bright, judgement and insight intact, normal speech and appearance well-groomed.      Diagnostic Tests:  Reviewed ED labs/notes          Video-Visit Details    Type of service:  Video Visit    Video End " Time:1548    Originating Location (pt. Location): Home    Distant Location (provider location):  [facility]     Platform used for Video Visit: Jacob

## 2022-04-29 DIAGNOSIS — F41.1 GENERALIZED ANXIETY DISORDER: ICD-10-CM

## 2022-04-29 NOTE — TELEPHONE ENCOUNTER
Routing refill request to provider for review/approval because:  Drug not on the [facility] refill protocol     Rene Hernández RN

## 2022-04-29 NOTE — TELEPHONE ENCOUNTER
It does not look like this was ever prescribed by Lety and was not discussed at pt's last visit.  Will need to be addressed by Lety upon her return    Karen Mcmanus, DO       Spoke with patient, informed last labs Sept 2017.  Informed no PSA blood level done.  Patient informed me he has to go to [facility] to have labs drawn.  Informed [facility] is contracted with [facility], he should check with representative to confirm, if he gets labs done at [facility], he can visit any [facility] nearest him.  If it is determined he can only go to [facility] (external lab) patient can stop by to obtain lab order or it can be mailed to him.  Patient stated if this is so, he will call us back for order.

## 2022-05-03 RX ORDER — GABAPENTIN 300 MG/1
CAPSULE ORAL
Qty: 90 CAPSULE | Refills: 0 | OUTPATIENT
Start: 2022-05-03

## 2022-05-03 NOTE — TELEPHONE ENCOUNTER
She was to leave a urine sample and get a new appointment scheduled with psychiatry - both need to happen before I can continue to refill medications given history    Lety Bowers PA-C

## 2022-05-05 NOTE — TELEPHONE ENCOUNTER
Routing refill request to provider for review/approval because:  Medication is reported/historical    Rene Hernández RN

## 2022-05-06 RX ORDER — TRAZODONE HYDROCHLORIDE 100 MG/1
TABLET ORAL
Qty: 30 TABLET | Refills: 0 | OUTPATIENT
Start: 2022-05-06

## 2022-05-12 NOTE — TELEPHONE ENCOUNTER
Pt scheduled for lab, given number to schedule with psychiatry.    Tamika Billings, JUSTIN Gardner

## 2022-05-18 ENCOUNTER — LAB (OUTPATIENT)
Dept: LAB | Facility: CLINIC | Age: 22
End: 2022-05-18
Payer: COMMERCIAL

## 2022-05-18 DIAGNOSIS — F19.10 POLYSUBSTANCE ABUSE (H): ICD-10-CM

## 2022-05-18 DIAGNOSIS — F90.2 ATTENTION DEFICIT HYPERACTIVITY DISORDER (ADHD), COMBINED TYPE: ICD-10-CM

## 2022-05-18 LAB
AMPHETAMINES UR QL: NOT DETECTED
BARBITURATES UR QL SCN: NOT DETECTED
BENZODIAZ UR QL SCN: NOT DETECTED
BUPRENORPHINE UR QL: NOT DETECTED
CANNABINOIDS UR QL: NOT DETECTED
COCAINE UR QL SCN: NOT DETECTED
D-METHAMPHET UR QL: NOT DETECTED
METHADONE UR QL SCN: NOT DETECTED
OPIATES UR QL SCN: NOT DETECTED
OXYCODONE UR QL SCN: NOT DETECTED
PCP UR QL SCN: NOT DETECTED
PROPOXYPH UR QL: NOT DETECTED
TRICYCLICS UR QL SCN: NOT DETECTED

## 2022-05-18 PROCEDURE — 80306 DRUG TEST PRSMV INSTRMNT: CPT

## 2022-05-20 ENCOUNTER — TELEPHONE (OUTPATIENT)
Dept: FAMILY MEDICINE | Facility: CLINIC | Age: 22
End: 2022-05-20
Payer: COMMERCIAL

## 2022-05-20 DIAGNOSIS — F90.9 ATTENTION DEFICIT HYPERACTIVITY DISORDER (ADHD), UNSPECIFIED ADHD TYPE: ICD-10-CM

## 2022-05-20 DIAGNOSIS — F41.1 GENERALIZED ANXIETY DISORDER: ICD-10-CM

## 2022-05-20 NOTE — TELEPHONE ENCOUNTER
Patient's call transferred to author  Patient requesting results from urine drug screen that was done on 5/18/2022     No result note  Will forward to provider to review    Rene Hernández RN

## 2022-05-20 NOTE — TELEPHONE ENCOUNTER
Call placed to patient  Relayed Germania's message    Patient verbalized understanding  States she has an appointment scheduled with a psychiatrist at [facility] next week   No further questions/concerns    Rene Hernández RN

## 2022-05-20 NOTE — TELEPHONE ENCOUNTER
The urine drug screen was all clear /negative  I had asked her to do this before prescribing certain medications so based on results we can readdress that however part of the deal was that she would also establish with a new psychiatrist - has she done this?     Lety Bowers PA-C

## 2022-05-24 RX ORDER — LISDEXAMFETAMINE DIMESYLATE 10 MG/1
CAPSULE ORAL
Qty: 30 CAPSULE | Refills: 0 | OUTPATIENT
Start: 2022-05-24

## 2022-05-24 RX ORDER — GABAPENTIN 300 MG/1
CAPSULE ORAL
Qty: 90 CAPSULE | Refills: 0 | OUTPATIENT
Start: 2022-05-24

## 2022-05-25 NOTE — TELEPHONE ENCOUNTER
Routing refill request to provider for review/approval because:  Drug not on the [facility] refill protocol   Thank you.  Lisandro Ace RN

## 2022-05-26 RX ORDER — TRAZODONE HYDROCHLORIDE 100 MG/1
TABLET ORAL
Qty: 30 TABLET | Refills: 0 | OUTPATIENT
Start: 2022-05-26

## 2022-06-02 ENCOUNTER — OFFICE VISIT (OUTPATIENT)
Dept: FAMILY MEDICINE | Facility: CLINIC | Age: 22
End: 2022-06-02
Payer: COMMERCIAL

## 2022-06-02 VITALS
SYSTOLIC BLOOD PRESSURE: 110 MMHG | DIASTOLIC BLOOD PRESSURE: 70 MMHG | HEIGHT: 66 IN | BODY MASS INDEX: 21.6 KG/M2 | TEMPERATURE: 98.4 F | HEART RATE: 64 BPM | WEIGHT: 134.4 LBS | OXYGEN SATURATION: 98 % | RESPIRATION RATE: 17 BRPM

## 2022-06-02 DIAGNOSIS — F19.10 POLYSUBSTANCE ABUSE (H): ICD-10-CM

## 2022-06-02 DIAGNOSIS — Z87.81 HISTORY OF FRACTURE OF NASAL BONE: ICD-10-CM

## 2022-06-02 DIAGNOSIS — J34.2 DEVIATED NASAL SEPTUM: ICD-10-CM

## 2022-06-02 DIAGNOSIS — Z01.810 PRE-OPERATIVE CARDIOVASCULAR EXAMINATION: Primary | ICD-10-CM

## 2022-06-02 PROCEDURE — 99214 OFFICE O/P EST MOD 30 MIN: CPT | Performed by: NURSE PRACTITIONER

## 2022-06-02 ASSESSMENT — PATIENT HEALTH QUESTIONNAIRE - PHQ9
10. IF YOU CHECKED OFF ANY PROBLEMS, HOW DIFFICULT HAVE THESE PROBLEMS MADE IT FOR YOU TO DO YOUR WORK, TAKE CARE OF THINGS AT HOME, OR GET ALONG WITH OTHER PEOPLE: SOMEWHAT DIFFICULT
SUM OF ALL RESPONSES TO PHQ QUESTIONS 1-9: 6
SUM OF ALL RESPONSES TO PHQ QUESTIONS 1-9: 6

## 2022-06-04 ENCOUNTER — OFFICE VISIT (OUTPATIENT)
Dept: FAMILY MEDICINE | Facility: CLINIC | Age: 22
End: 2022-06-04
Payer: COMMERCIAL

## 2022-06-04 VITALS
SYSTOLIC BLOOD PRESSURE: 126 MMHG | OXYGEN SATURATION: 98 % | HEART RATE: 93 BPM | TEMPERATURE: 98.8 F | DIASTOLIC BLOOD PRESSURE: 73 MMHG

## 2022-06-04 DIAGNOSIS — L02.419 ARM ABSCESS: Primary | ICD-10-CM

## 2022-06-04 PROCEDURE — 87186 SC STD MICRODIL/AGAR DIL: CPT | Performed by: FAMILY MEDICINE

## 2022-06-04 PROCEDURE — 87070 CULTURE OTHR SPECIMN AEROBIC: CPT | Performed by: FAMILY MEDICINE

## 2022-06-04 PROCEDURE — 87077 CULTURE AEROBIC IDENTIFY: CPT | Performed by: FAMILY MEDICINE

## 2022-06-04 PROCEDURE — 10060 I&D ABSCESS SIMPLE/SINGLE: CPT | Performed by: FAMILY MEDICINE

## 2022-06-04 RX ORDER — LIDOCAINE HYDROCHLORIDE AND EPINEPHRINE 10; 10 MG/ML; UG/ML
0.5 INJECTION, SOLUTION INFILTRATION; PERINEURAL ONCE
Status: COMPLETED | OUTPATIENT
Start: 2022-06-04 | End: 2022-06-04

## 2022-06-04 RX ADMIN — LIDOCAINE HYDROCHLORIDE AND EPINEPHRINE 0.5 ML: 10; 10 INJECTION, SOLUTION INFILTRATION; PERINEURAL at 20:22

## 2022-06-05 NOTE — PATIENT INSTRUCTIONS
Keep bandage on your arm for the next 24 to 48 hours.  Continue on the cephalexin for the entire course of antibiotics.  We will send off a culture and we will notify you if we need to change the antibiotic.

## 2022-06-05 NOTE — PROGRESS NOTES
Assessment:       Arm abscess  - Abscess Aerobic Bacterial Culture Routine         Plan:     Patient with small abscess on her left forearm in the area where she recently got a tattoo.  She is already on cephalexin for 7 days.  We will do wound culture and adjust antibiotic as needed.  I&D performed today.  Follow-up if this getting worse or not improving.    MEDICATIONS:   Orders Placed This Encounter   Medications     lidocaine 1% with EPINEPHrine 1:100,000 injection 0.5 mL         Patient Instructions   Keep bandage on your arm for the next 24 to 48 hours.  Continue on the cephalexin for the entire course of antibiotics.  We will send off a culture and we will notify you if we need to change the antibiotic.      Subjective:       21 year old female presents for evaluation of a raised, red and painful lump on her left forearm in the area where she recently got a new tattoo.  She was given some cephalexin by a pharmacist and has taken 5 doses of this now and it has not gotten better and the lump is continued to be more raised and she thinks she may have an abscess forming.  He has not had any fevers.  No other complaints or concerns.    Patient Active Problem List   Diagnosis     IUD (intrauterine device) in place     Depression     MARIVEL (generalized anxiety disorder)     Polysubstance abuse (H)     Moderate major depression (H)     Nasal obstruction     Closed fracture of nasal bone, initial encounter     Deviated nasal septum     Hypertrophy of both inferior nasal turbinates     History of fracture of nasal bone     Acquired nasal deformity       No past medical history on file.    No past surgical history on file.    Current Outpatient Medications   Medication     gabapentin (NEURONTIN) 300 MG capsule     levonorgestrel (MIRENA) 20 MCG/24HR IUD     traZODone (DESYREL) 100 MG tablet     venlafaxine (EFFEXOR-XR) 150 MG 24 hr capsule     valACYclovir (VALTREX) 1000 mg tablet     VYVANSE 10 MG capsule     No current  facility-administered medications for this visit.       Allergies   Allergen Reactions     Seasonal Allergies        Family History   Problem Relation Age of Onset     Allergies Sister         possible seafood allergy.     Substance Abuse Maternal Grandmother      Substance Abuse Maternal Grandfather      Substance Abuse Paternal Grandmother      Substance Abuse Paternal Grandfather      Family History Negative No family hx of         bleeding disorders     Asthma No family hx of      C.A.D. No family hx of      Diabetes No family hx of      Hypertension No family hx of      Cerebrovascular Disease No family hx of      Breast Cancer No family hx of      Cancer - colorectal No family hx of      Prostate Cancer No family hx of        Social History     Socioeconomic History     Marital status: Single     Spouse name: None     Number of children: None     Years of education: None     Highest education level: None   Tobacco Use     Smoking status: Current Every Day Smoker     Types: Other     Smokeless tobacco: Never Used     Tobacco comment: currently uses e-cig daily   Vaping Use     Vaping Use: Every day   Substance and Sexual Activity     Alcohol use: Yes     Alcohol/week: 0.0 standard drinks     Comment: social     Drug use: Not Currently     Types: Marijuana, Benzodiazepines     Comment: 1 xanax every two weeks     Sexual activity: Yes     Partners: Male     Birth control/protection: I.U.D.   Social History Narrative    Lives at home with mom and her Fiance, and older siblings. Fiance smokes.  One dog.          Review of Systems  Pertinent items are noted in HPI.      Objective:     /73 (BP Location: Right arm, Patient Position: Sitting, Cuff Size: Adult Regular)   Pulse 93   Temp 98.8  F (37.1  C) (Oral)   LMP  (LMP Unknown)   SpO2 98%      General appearance: alert, appears stated age and cooperative  Extremities: Patient with new tattoo on her left forearm.  She has a 1.5 cm raised lump on her left  forearm that looks like it has come to ahead.  There is some fluctuance noted.  It is tender to the touch and red.    Procedure:  Risks and benefits were discussed for the procedure of I&D, patient agrees to proceed.  Area sterilely prepped.  0.5 mL of 1% lidocaine with epinephrine injected into the abscess.  Using an 11 blade a stellate incision was made and a small amount of purulent fluid was drained.  Not amenable to packing.  Wound culture obtained.  Patient tolerated the procedure well.  Sterile bandage applied.    This note has been dictated using voice recognition software. Any grammatical or context distortions are unintentional and inherent to the software

## 2022-06-07 ENCOUNTER — TELEPHONE (OUTPATIENT)
Dept: OTOLARYNGOLOGY | Facility: CLINIC | Age: 22
End: 2022-06-07
Payer: COMMERCIAL

## 2022-06-07 DIAGNOSIS — A49.02 MRSA INFECTION: Primary | ICD-10-CM

## 2022-06-07 LAB — BACTERIA ABSC ANAEROBE+AEROBE CULT: ABNORMAL

## 2022-06-07 RX ORDER — SULFAMETHOXAZOLE/TRIMETHOPRIM 800-160 MG
1 TABLET ORAL 2 TIMES DAILY
Qty: 14 TABLET | Refills: 0 | Status: SHIPPED | OUTPATIENT
Start: 2022-06-07 | End: 2022-06-14

## 2022-06-07 NOTE — TELEPHONE ENCOUNTER
Pt will see Dr. Pizano at the [facility] for 3D imaging.    [facility] staff will reach out to coordinate appt.    Nyasia Rosales RN  6/7/2022 3:46 PM

## 2022-06-07 NOTE — TELEPHONE ENCOUNTER
Health Call Center    Phone Message    May a detailed message be left on voicemail: yes     Reason for Call: Other: pt called because she had an appt with Oralia Francis this AM for sx. Pt wanted more done that what Dr. Francis had planned so Dr. Francis advised you to make an appt for a consult with her to discuss future plans. Unable to locate notes or expectations. Pt reports that Dr. Francis informed her that someone from her teamw ould call this morning to schedule that but pt has not heard anything as of yet. Please call pt to disucss.      Action Taken: Message routed to:  [facility] ENT    Travel Screening: Not Applicable

## 2022-06-08 DIAGNOSIS — F41.1 GENERALIZED ANXIETY DISORDER: ICD-10-CM

## 2022-06-08 NOTE — TELEPHONE ENCOUNTER
Routing refill request to provider for review/approval because:  Medication is reported/historical    Rene Hernnádez RN

## 2022-06-10 RX ORDER — VENLAFAXINE HYDROCHLORIDE 150 MG/1
CAPSULE, EXTENDED RELEASE ORAL
Qty: 30 CAPSULE | Refills: 1 | OUTPATIENT
Start: 2022-06-10

## 2022-07-29 ENCOUNTER — TELEPHONE (OUTPATIENT)
Dept: OTOLARYNGOLOGY | Facility: CLINIC | Age: 22
End: 2022-07-29

## 2022-07-29 ENCOUNTER — OFFICE VISIT (OUTPATIENT)
Dept: PLASTIC SURGERY | Facility: CLINIC | Age: 22
End: 2022-07-29

## 2022-07-29 DIAGNOSIS — Z41.1 ELECTIVE PROCEDURE FOR UNACCEPTABLE COSMETIC APPEARANCE: Primary | ICD-10-CM

## 2022-07-29 NOTE — LETTER
7/29/2022       RE: Lamar Rosas  [address]  [place] [address]     Dear Colleague,    Thank you for referring your patient, Lamar Rosas, to the [facility]. Please see a copy of my visit note below.      Facial Plastic and Reconstructive Surgery      Lamar Rosas comes is for cosmetic discussion of nasal changes.   We cancelled her functional surgery since she expressed desire for external appearance changes in addition and we discussed those are out of pocket expense and need to be discussed.     She had a tension tip with a prominent anterior septal angle. When she talks and smiles, her lower laterals contract back projecting the septum. She has a strong profile. Her nose is shifted to the left.     A/P:  We morphed and discussed cosmetic changes.  She will be deprojected, primarily through reduction of her dorsum.   We gave her a cosmetic quote.         Sincerely,    Dacia Pizano MD

## 2022-07-29 NOTE — TELEPHONE ENCOUNTER
Left vm and informed pt that Dr. Pizano would like her to stop vaping at least 3 months prior to nasal surgery.    Left my direct dial for call back.    Nyasia Rosales RN  7/29/2022 3:34 PM

## 2022-07-29 NOTE — LETTER
July 29, 2022  Re: Lamar Rosas  2000    Dear Dr. Gonzalez,    Thank you so much for referring Lamar Rosas to the [facility]. I had the pleasure of visiting with Lamar today.     Attached you will find a copy of my note. Please feel free to reach out to me with any questions, [phone].       Facial Plastic and Reconstructive Surgery      Lamar Rosas comes is for cosmetic discussion of nasal changes.   We cancelled her functional surgery since she expressed desire for external appearance changes in addition and we discussed those are out of pocket expense and need to be discussed.     She had a tension tip with a prominent anterior septal angle. When she talks and smiles, her lower laterals contract back projecting the septum. She has a strong profile. Her nose is shifted to the left.     A/P:  We morphed and discussed cosmetic changes.  She will be deprojected, primarily through reduction of her dorsum.   We gave her a cosmetic quote.       Sincerely,    Dacia Pizano MD

## 2022-07-29 NOTE — PROGRESS NOTES
Facial Plastic and Reconstructive Surgery      Lamar Rosas comes is for cosmetic discussion of nasal changes.   We cancelled her functional surgery since she expressed desire for external appearance changes in addition and we discussed those are out of pocket expense and need to be discussed.     She had a tension tip with a prominent anterior septal angle. When she talks and smiles, her lower laterals contract back projecting the septum. She has a strong profile. Her nose is shifted to the left.     A/P:  We morphed and discussed cosmetic changes.  She will be deprojected, primarily through reduction of her dorsum.   We gave her a cosmetic quote.

## 2022-08-03 NOTE — NURSING NOTE
2D/3D photodocumentation obtained.    Gave pt a cosmetic quote for Dorsal Hump Reduction (see Media tab).    Discussed quote in great detail with pt, including the fact that the Anesthesia and Facility fees are an estimate based on time and may be subject to change.    Pt would like to move forward with scheduling as soon as possible.      Dr. Pizano wants pt to be nicotine free (no vaping) for 3 mos prior to surgery.    Nyasia Rosales RN  8/3/2022 4:51 PM

## 2022-08-27 ENCOUNTER — MEDICAL CORRESPONDENCE (OUTPATIENT)
Dept: HEALTH INFORMATION MANAGEMENT | Facility: CLINIC | Age: 22
End: 2022-08-27

## 2022-08-27 ENCOUNTER — TELEPHONE (OUTPATIENT)
Dept: OTOLARYNGOLOGY | Facility: CLINIC | Age: 22
End: 2022-08-27

## 2022-08-27 NOTE — TELEPHONE ENCOUNTER
Called patient to schedule surgery with Dr. Jhoana Cesar. Scheduled patient for January 3, 2022. Pt was made aware that Dr Jhoana Cesar wants her to stop vaping 3 months prior to surgery. Informed patient that she risks being cancelled the morning of surgery if she does not stop. Patient is aware.     Date of Surgery: 01/03/2022  Approximate arrival time given:  No  Location of surgery: Other: [facility]   Pre-Op H&P: PCP within 30 days   Post-Op Appt Date: 1 week RN at [facility]    Imaging needed:  No  Discussed COVID-19 Testing: Yes     Patient aware that pre-op RN will call 2-3 days prior to surgery with arrival time and instructions Yes  Packet sent out: Yes 08/27/22    All patients questions were answered and was instructed to review surgical packet and call back with any questions or concerns.       Maribel Urena on 8/27/2022 at 3:43 PM

## 2022-08-29 LAB — PHQ9 SCORE: 16

## 2022-08-31 ENCOUNTER — MEDICAL CORRESPONDENCE (OUTPATIENT)
Dept: HEALTH INFORMATION MANAGEMENT | Facility: CLINIC | Age: 22
End: 2022-08-31

## 2022-09-29 ENCOUNTER — TRANSFERRED RECORDS (OUTPATIENT)
Dept: HEALTH INFORMATION MANAGEMENT | Facility: CLINIC | Age: 22
End: 2022-09-29

## 2022-12-27 ENCOUNTER — OFFICE VISIT (OUTPATIENT)
Dept: FAMILY MEDICINE | Facility: CLINIC | Age: 22
End: 2022-12-27
Payer: COMMERCIAL

## 2022-12-27 VITALS
OXYGEN SATURATION: 98 % | RESPIRATION RATE: 14 BRPM | WEIGHT: 140.4 LBS | DIASTOLIC BLOOD PRESSURE: 72 MMHG | SYSTOLIC BLOOD PRESSURE: 114 MMHG | TEMPERATURE: 96.8 F | HEART RATE: 84 BPM | BODY MASS INDEX: 22.03 KG/M2 | HEIGHT: 67 IN

## 2022-12-27 DIAGNOSIS — F41.1 GENERALIZED ANXIETY DISORDER: ICD-10-CM

## 2022-12-27 DIAGNOSIS — J34.2 DEVIATED NASAL SEPTUM: ICD-10-CM

## 2022-12-27 DIAGNOSIS — Z01.818 PREOP GENERAL PHYSICAL EXAM: Primary | ICD-10-CM

## 2022-12-27 DIAGNOSIS — F33.1 MODERATE EPISODE OF RECURRENT MAJOR DEPRESSIVE DISORDER (H): ICD-10-CM

## 2022-12-27 DIAGNOSIS — Z87.81 HISTORY OF FRACTURE OF NASAL BONE: ICD-10-CM

## 2022-12-27 PROCEDURE — 96127 BRIEF EMOTIONAL/BEHAV ASSMT: CPT | Performed by: FAMILY MEDICINE

## 2022-12-27 PROCEDURE — 99214 OFFICE O/P EST MOD 30 MIN: CPT | Mod: 25 | Performed by: FAMILY MEDICINE

## 2022-12-27 PROCEDURE — 90471 IMMUNIZATION ADMIN: CPT | Performed by: FAMILY MEDICINE

## 2022-12-27 PROCEDURE — 90686 IIV4 VACC NO PRSV 0.5 ML IM: CPT | Performed by: FAMILY MEDICINE

## 2022-12-27 ASSESSMENT — PAIN SCALES - GENERAL: PAINLEVEL: NO PAIN (0)

## 2022-12-27 ASSESSMENT — PATIENT HEALTH QUESTIONNAIRE - PHQ9
SUM OF ALL RESPONSES TO PHQ QUESTIONS 1-9: 3
10. IF YOU CHECKED OFF ANY PROBLEMS, HOW DIFFICULT HAVE THESE PROBLEMS MADE IT FOR YOU TO DO YOUR WORK, TAKE CARE OF THINGS AT HOME, OR GET ALONG WITH OTHER PEOPLE: SOMEWHAT DIFFICULT
SUM OF ALL RESPONSES TO PHQ QUESTIONS 1-9: 3

## 2022-12-27 NOTE — PATIENT INSTRUCTIONS
You may hold gabapentin on morning of the surgery. Resume it after surgery when allowed to drink water.    Continue all other medications as prescribed.    Do not take Ibuprofen, Aspirin or Naproxen from now until after procedure.  If you need to take something for pain, take Acetaminophen 325 mg orally 1-2 tabs every 4-6 hrs as needed for pain     Schedule wellness exam in January or 2023 so you may complete your fist pap smear and other health screenings.    For informational purposes only. Not to replace the advice of your health care provider. Copyright   ,  [employer]. All rights reserved. Clinically reviewed by Yaritza Massey MD. UEIS [id number] - REV .  Preparing for Your Surgery  Getting started  A nurse will call you to review your health history and instructions. They will give you an arrival time based on your scheduled surgery time. Please be ready to share:  Your doctor's clinic name and phone number  Your medical, surgical, and anesthesia history  A list of allergies and sensitivities  A list of medicines, including herbal treatments and over-the-counter drugs  Whether the patient has a legal guardian (ask how to send us the papers in advance)  Please tell us if you're pregnant--or if there's any chance you might be pregnant. Some surgeries may injure a fetus (unborn baby), so they require a pregnancy test. Surgeries that are safe for a fetus don't always need a test, and you can choose whether to have one.   If you have a child who's having surgery, please ask for a copy of Preparing for Your Child's Surgery.    Preparing for surgery  Within 10 to 30 days of surgery: Have a pre-op exam (sometimes called an H&P, or History and Physical). This can be done at a clinic or pre-operative center.  If you're having a , you may not need this exam. Talk to your care team.  At your pre-op exam, talk to your care team about all medicines you take. If you need to stop  any medicines before surgery, ask when to start taking them again.  We do this for your safety. Many medicines can make you bleed too much during surgery. Some change how well surgery (anesthesia) drugs work.  Call your insurance company to let them know you're having surgery. (If you don't have insurance, call [phone].)  Call your clinic if there's any change in your health. This includes signs of a cold or flu (sore throat, runny nose, cough, rash, fever). It also includes a scrape or scratch near the surgery site.  If you have questions on the day of surgery, call your hospital or surgery center.  Eating and drinking guidelines  For your safety: Unless your surgeon tells you otherwise, follow the guidelines below.  Eat and drink as usual until 8 hours before you arrive for surgery. After that, no food or milk.  Drink clear liquids until 2 hours before you arrive. These are liquids you can see through, like water, Gatorade, and Propel Water. They also include plain black coffee and tea (no cream or milk), candy, and breath mints. You can spit out gum when you arrive.  If you drink alcohol: Stop drinking it the night before surgery.  If your care team tells you to take medicine on the morning of surgery, it's okay to take it with a sip of water.  Preventing infection  Shower or bathe the night before and morning of your surgery. Follow the instructions your clinic gave you. (If no instructions, use regular soap.)  Don't shave or clip hair near your surgery site. We'll remove the hair if needed.  Don't smoke or vape the morning of surgery. You may chew nicotine gum up to 2 hours before surgery. A nicotine patch is okay.  Note: Some surgeries require you to completely quit smoking and nicotine. Check with your surgeon.  Your care team will make every effort to keep you safe from infection. We will:  Clean our hands often with soap and water (or an alcohol-based hand rub).  Clean the skin at your surgery site  with a special soap that kills germs.  Give you a special gown to keep you warm. (Cold raises the risk of infection.)  Wear special hair covers, masks, gowns and gloves during surgery.  Give antibiotic medicine, if prescribed. Not all surgeries need antibiotics.  What to bring on the day of surgery  Photo ID and insurance card  Copy of your health care directive, if you have one  Glasses and hearing aids (bring cases)  You can't wear contacts during surgery  Inhaler and eye drops, if you use them (tell us about these when you arrive)  CPAP machine or breathing device, if you use them  A few personal items, if spending the night  If you have . . .  A pacemaker, ICD (cardiac defibrillator) or other implant: Bring the ID card.  An implanted stimulator: Bring the remote control.  A legal guardian: Bring a copy of the certified (court-stamped) guardianship papers.  Please remove any jewelry, including body piercings. Leave jewelry and other valuables at home.  If you're going home the day of surgery  You must have a responsible adult drive you home. They should stay with you overnight as well.  If you don't have someone to stay with you, and you aren't safe to go home alone, we may keep you overnight. Insurance often won't pay for this.  After surgery  If it's hard to control your pain or you need more pain medicine, please call your surgeon's office.  Questions?   If you have any questions for your care team, list them here: _________________________________________________________________________________________________________________________________________________________________________ ____________________________________ ____________________________________ ____________________________________

## 2022-12-27 NOTE — PROGRESS NOTES
[facility]  [address] [address]  Phone: [phone]  Primary Provider: John Kelley (Inactive)  Pre-op Performing Provider: GILLIAN JIMENEZ      PREOPERATIVE EVALUATION:  Today's date: 12/27/2022    Lamar Rosas is a 22 year old female who presents for a preoperative evaluation.    Surgical Information:  Surgery/Procedure: septorhinoplasty, cosmetic surgery also  [facility]  Surgeon: Dr Zakia Pizano  Surgery Date: 1/3/23  Time of Surgery: 7:30  Where patient plans to recover: At home with family  Fax number for surgical facility: [phone]    Type of Anesthesia Anticipated: General    Assessment & Plan     The proposed surgical procedure is considered INTERMEDIATE risk.    Preop general physical exam  No testing is indicated prior to procedure.  Patient is considered to have low cardiovascular risk for surgery.    Deviated nasal septum    History of fracture of nasal bone    Generalized anxiety disorder  Stable on venlafaxine.    Moderate episode of recurrent major depressive disorder (H)  Stable on venlafaxine.             Risks and Recommendations:  The patient has the following additional risks and recommendations for perioperative complications:   - No identified additional risk factors other than previously addressed    Medication Instructions:   - pregabalin, gabapentin: Continue without modification.   - SSRIs, SNRIs, TCAs, Antipsychotics: Continue without modification.     RECOMMENDATION:  APPROVAL GIVEN to proceed with proposed procedure, without further diagnostic evaluation.      Subjective     HPI related to upcoming procedure: Patient has deviated nasal septum and hx of nasal bone injury. Hence, planned surgery. Reviewed above with patient.    Preop Questions 12/27/2022   1. Have you ever had a heart attack or stroke? No   2. Have you ever had surgery on your heart or blood vessels, such  as a stent placement, a coronary artery bypass, or surgery on an artery in your head, neck, heart, or legs? No   3. Do you have chest pain with activity? No   4. Do you have a history of  heart failure? No   5. Do you currently have a cold, bronchitis or symptoms of other infection? No   6. Do you have a cough, shortness of breath, or wheezing? No   7. Do you or anyone in your family have previous history of blood clots? No   8. Do you or does anyone in your family have a serious bleeding problem such as prolonged bleeding following surgeries or cuts? No   9. Have you ever had problems with anemia or been told to take iron pills? No   10. Have you had any abnormal blood loss such as black, tarry or bloody stools, or abnormal vaginal bleeding? No   11. Have you ever had a blood transfusion? No   12. Are you willing to have a blood transfusion if it is medically needed before, during, or after your surgery? Yes   13. Have you or any of your relatives ever had problems with anesthesia? No   14. Do you have sleep apnea, excessive snoring or daytime drowsiness? No   15. Do you have any artifical heart valves or other implanted medical devices like a pacemaker, defibrillator, or continuous glucose monitor? No   16. Do you have artificial joints? No   17. Are you allergic to latex? No   18. Is there any chance that you may be pregnant? No       Health Care Directive:  Patient does not have a Health Care Directive or Living Will: Discussed advance care planning with patient; information given to patient to review.    Preoperative Review of :   reviewed - no record of controlled substances prescribed.      Status of Chronic Conditions:  See problem list for active medical problems.  Problems all longstanding and stable, except as noted/documented.  See ROS for pertinent symptoms related to these conditions.    DEPRESSION - Patient has a long history of Depression of moderate severity requiring medication for control  with recent symptoms being stable..Current symptoms of depression include none.       Review of Systems  CONSTITUTIONAL: NEGATIVE for fever, chills, change in weight  INTEGUMENTARY/SKIN: NEGATIVE for worrisome rashes, moles or lesions  EYES: NEGATIVE for vision changes or irritation  ENT/MOUTH: NEGATIVE for ear, mouth and throat problems  RESP: NEGATIVE for significant cough or SOB  CV: NEGATIVE for chest pain, palpitations or peripheral edema  GI: NEGATIVE for nausea, abdominal pain, heartburn, or change in bowel habits  : NEGATIVE for frequency, dysuria, or hematuria  MUSCULOSKELETAL: NEGATIVE for significant arthralgias or myalgia  NEURO: NEGATIVE for weakness, dizziness or paresthesias  ENDOCRINE: NEGATIVE for temperature intolerance, skin/hair changes  HEME: NEGATIVE for bleeding problems  PSYCHIATRIC: NEGATIVE for changes in mood or affect    Patient Active Problem List    Diagnosis Date Noted     History of fracture of nasal bone 11/18/2021     Priority: Medium     Added automatically from request for surgery [id number]       Acquired nasal deformity 11/18/2021     Priority: Medium     Added automatically from request for surgery [id number]       Nasal obstruction 11/17/2021     Priority: Medium     Closed fracture of nasal bone, initial encounter 11/17/2021     Priority: Medium     Deviated nasal septum 11/17/2021     Priority: Medium     Hypertrophy of both inferior nasal turbinates 11/17/2021     Priority: Medium     Moderate major depression (H) 05/12/2021     Priority: Medium     Polysubstance abuse (H) 04/10/2019     Priority: Medium     MARIVEL (generalized anxiety disorder) 07/19/2018     Priority: Medium     Depression 07/17/2018     Priority: Medium     IUD (intrauterine device) in place 06/16/2017     Priority: Medium     mirena placed 6/16/2017          No past medical history on file.  No past surgical history on file.  Current Outpatient Medications   Medication Sig Dispense Refill     gabapentin  "(NEURONTIN) 300 MG capsule 3 pills every day       levonorgestrel (MIRENA) 20 MCG/24HR IUD 1 each by Intrauterine route once       traZODone (DESYREL) 100 MG tablet take one tablet at bedtime       venlafaxine (EFFEXOR-XR) 150 MG 24 hr capsule TAKE 1 CAPSULE BY MOUTH EVERY DAY       valACYclovir (VALTREX) 1000 mg tablet Take 2 tablets (2,000 mg) by mouth 2 times daily (Patient not taking: Reported on 12/27/2022) 4 tablet 0     VYVANSE 10 MG capsule TAKE ONE CAP BY MOUTH DAILY IN AM (Patient not taking: Reported on 6/2/2022) 30 capsule 0       Allergies   Allergen Reactions     Seasonal Allergies         Social History     Tobacco Use     Smoking status: Former     Types: Other     Smokeless tobacco: Never     Tobacco comments:     Nicotene patches   Substance Use Topics     Alcohol use: Yes     Alcohol/week: 0.0 standard drinks     Comment: social     Patient reports 5 drinks per 7 day period.    Family History   Problem Relation Age of Onset     Allergies Sister         possible seafood allergy.     Substance Abuse Maternal Grandmother      Substance Abuse Maternal Grandfather      Substance Abuse Paternal Grandmother      Substance Abuse Paternal Grandfather      Family History Negative No family hx of         bleeding disorders     Asthma No family hx of      C.A.D. No family hx of      Diabetes No family hx of      Hypertension No family hx of      Cerebrovascular Disease No family hx of      Breast Cancer No family hx of      Cancer - colorectal No family hx of      Prostate Cancer No family hx of      History   Drug Use Unknown     Comment: 1 xanax every two weeks         Objective     /72   Pulse 84   Temp 96.8  F (36  C) (Tympanic)   Resp 14   Ht 1.695 m (5' 6.75\")   Wt 63.7 kg (140 lb 6.4 oz)   SpO2 98%   BMI 22.15 kg/m      Physical Exam  GENERAL APPEARANCE: healthy, alert and no distress; ambulatory w/o assist  EYES: pink conj, no icterus, PERRL, EOMI  HENT: ear canals and TM's normal, nose " and mouth without ulcers or lesions, oropharynx clear and oral mucous membranes moist  NECK: no adenopathy, no asymmetry, masses, or scars and thyroid normal to palpation  RESP: lungs clear to auscultation - no rales, rhonchi or wheezes  CV: regular rates and rhythm, normal S1 S2, no S3 or S4, no murmur, click or rub, no peripheral edema and peripheral pulses strong  ABDOMEN: soft, nontender, no hepatosplenomegaly, no masses and bowel sounds normal  MS: no musculoskeletal defects are noted and gait is age appropriate without ataxia  SKIN: good turgor, no rash/jaundice/ecchymosis  NEURO: Normal strength and tone, sensory exam grossly normal, mentation intact and speech normal    Recent Labs   Lab Test 08/11/21 1954   HGB 14.0         POTASSIUM 4.4   CR 0.94        Diagnostics:  No labs were ordered during this visit.   No EKG required, no history of coronary heart disease, significant arrhythmia, peripheral arterial disease or other structural heart disease.    Revised Cardiac Risk Index (RCRI):  The patient has the following serious cardiovascular risks for perioperative complications:   - No serious cardiac risks = 0 points     RCRI Interpretation: 0 points: Class I (very low risk - 0.4% complication rate)           Signed Electronically by: Dawood Phipps MD  Copy of this evaluation report is provided to requesting physician.      Answers for HPI/ROS submitted by the patient on 12/27/2022  If you checked off any problems, how difficult have these problems made it for you to do your work, take care of things at home, or get along with other people?: Somewhat difficult  PHQ9 TOTAL SCORE: 3

## 2023-01-03 DIAGNOSIS — Z98.890 POSTOPERATIVE STATE: Primary | ICD-10-CM

## 2023-01-03 RX ORDER — ONDANSETRON 4 MG/1
4 TABLET, ORALLY DISINTEGRATING ORAL EVERY 8 HOURS PRN
Qty: 8 TABLET | Refills: 0 | Status: SHIPPED | OUTPATIENT
Start: 2023-01-03

## 2023-01-03 RX ORDER — OXYCODONE HYDROCHLORIDE 5 MG/1
5 TABLET ORAL EVERY 6 HOURS PRN
Qty: 16 TABLET | Refills: 0 | Status: SHIPPED | OUTPATIENT
Start: 2023-01-03 | End: 2023-01-07

## 2023-01-03 RX ORDER — ACETAMINOPHEN 500 MG
500 TABLET ORAL EVERY 6 HOURS
Qty: 40 TABLET | Refills: 0 | Status: SHIPPED | OUTPATIENT
Start: 2023-01-03 | End: 2023-01-13

## 2023-01-03 RX ORDER — CEPHALEXIN 500 MG/1
500 CAPSULE ORAL 2 TIMES DAILY
Qty: 14 CAPSULE | Refills: 0 | Status: SHIPPED | OUTPATIENT
Start: 2023-01-03 | End: 2023-01-10

## 2023-01-03 RX ORDER — ECHINACEA PURPUREA EXTRACT 125 MG
2 TABLET ORAL
Qty: 60 ML | Refills: 11 | Status: SHIPPED | OUTPATIENT
Start: 2023-01-03 | End: 2023-02-02

## 2023-01-10 ENCOUNTER — ALLIED HEALTH/NURSE VISIT (OUTPATIENT)
Dept: PLASTIC SURGERY | Facility: CLINIC | Age: 23
End: 2023-01-10
Payer: COMMERCIAL

## 2023-01-10 DIAGNOSIS — Z98.890 POSTOPERATIVE STATE: Primary | ICD-10-CM

## 2023-01-17 NOTE — PROGRESS NOTES
Pt comes in POD # 7 s/p Septorhinoplasty w/ bilateral turbinate reduction + cosmetic dorsal hump reduction.    Nasal cast and bilateral Gilbert splints removed.    Pt's nose is appropriately swollen and tender.    Bilateral nasal passageways suctioned of excess mucous and crusting.    Pt pleased with improvement in nasal breathing and also pleased with her profile.    We discussed how her nose is still very swollen and that it will gradually improve over the next many weeks.    Continue with frequent use of nasal saline spray.    F/u in one month.    Nyasia Rosales RN  1/17/2023 1:38 PM

## 2023-02-17 ENCOUNTER — VIRTUAL VISIT (OUTPATIENT)
Dept: PLASTIC SURGERY | Facility: CLINIC | Age: 23
End: 2023-02-17
Payer: COMMERCIAL

## 2023-02-17 DIAGNOSIS — Z98.890 POSTOPERATIVE STATE: Primary | ICD-10-CM
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
## 2023-04-22 ENCOUNTER — HEALTH MAINTENANCE LETTER (OUTPATIENT)
Age: 23
End: 2023-04-22

## 2023-05-01 ENCOUNTER — TELEPHONE (OUTPATIENT)
Dept: FAMILY MEDICINE | Facility: CLINIC | Age: 23
End: 2023-05-01
Payer: COMMERCIAL

## 2023-05-01 NOTE — TELEPHONE ENCOUNTER
Patient Quality Outreach    Patient is due for the following:   Cervical Cancer Screening - PAP Needed  Physical Preventive Adult Physical    Next Steps:   Schedule a Adult Preventative    Type of outreach:    Sent letter.      Questions for provider review:    None     Jakub Lin, CMA

## 2023-05-01 NOTE — LETTER
May 1, 2023      Lamar Rosas  [address]  [place] [address]        Dear Lamar,       Your team at [facility] cares about your health. We have reviewed your chart and based on our findings; we are making the following recommendations to better manage your health.     You are in particular need of attention regarding the following:     Schedule a primary care office visit with your provider for a Pap Smear to screen for Cervical Cancer.  PREVENTATIVE VISIT: Physical    If you have already completed these items, please contact the clinic via phone or   [employer] so your care team can review and update your records. Thank you for   choosing [facility] for your healthcare needs. For any questions,   concerns, or to schedule an appointment please contact our clinic.        Sincerely,        Arpita Edouard NP

## 2023-07-31 ENCOUNTER — TELEPHONE (OUTPATIENT)
Dept: FAMILY MEDICINE | Facility: CLINIC | Age: 23
End: 2023-07-31
Payer: COMMERCIAL

## 2023-07-31 NOTE — LETTER
July 31, 2023      Lamar Rosas  [address]  [place] [address]        Dear Lamar,     Your team at [facility] cares about your health. We have reviewed your chart and based on our findings; we are making the following recommendations to better manage your health.     You are in particular need of attention regarding the following:     Schedule a primary care office visit with your provider for a Pap Smear to screen for Cervical Cancer.  PREVENTATIVE VISIT: Physical    If you have already completed these items, please contact the clinic via phone or   [employer] so your care team can review and update your records. Thank you for   choosing [facility] for your healthcare needs. For any questions,   concerns, or to schedule an appointment please contact our clinic.    Healthy Regards,      Your [facility] Care Team

## 2023-11-07 ENCOUNTER — TELEPHONE (OUTPATIENT)
Dept: FAMILY MEDICINE | Facility: CLINIC | Age: 23
End: 2023-11-07
Payer: COMMERCIAL

## 2023-11-07 NOTE — TELEPHONE ENCOUNTER
Patient Quality Outreach    Patient is due for the following:   Cervical Cancer Screening - PAP Needed  Physical Preventive Adult Physical    Next Steps:   Schedule a Adult Preventative    Type of outreach:    Sent letter.      Questions for provider review:    None           Hanna Mcfarland MA

## 2023-11-07 NOTE — LETTER
November 7, 2023    To  Lamar Rosas  [address]  [place] [address]    Your team at [facility] cares about your health. We have reviewed your chart and based on our findings; we are making the following recommendations to better manage your health.     You are in particular need of attention regarding the following:     Schedule a primary care office visit with your provider for a Pap Smear to screen for Cervical Cancer.  PREVENTATIVE VISIT: Physical    If you have already completed these items, please contact the clinic via phone or   [employer] so your care team can review and update your records. Thank you for   choosing [facility] for your healthcare needs. For any questions,   concerns, or to schedule an appointment please contact our clinic.    Healthy Regards,      Your [facility] Care Team

## 2024-04-22 ENCOUNTER — APPOINTMENT (OUTPATIENT)
Dept: ULTRASOUND IMAGING | Facility: CLINIC | Age: 24
End: 2024-04-22
Attending: EMERGENCY MEDICINE
Payer: COMMERCIAL

## 2024-04-22 ENCOUNTER — HOSPITAL ENCOUNTER (EMERGENCY)
Facility: CLINIC | Age: 24
Discharge: HOME | End: 2024-04-22
Attending: EMERGENCY MEDICINE | Admitting: EMERGENCY MEDICINE
Payer: COMMERCIAL

## 2024-04-22 VITALS
TEMPERATURE: 98.2 F | OXYGEN SATURATION: 99 % | HEART RATE: 62 BPM | RESPIRATION RATE: 18 BRPM | DIASTOLIC BLOOD PRESSURE: 81 MMHG | SYSTOLIC BLOOD PRESSURE: 118 MMHG

## 2024-04-22 DIAGNOSIS — N73.0 PID (ACUTE PELVIC INFLAMMATORY DISEASE): ICD-10-CM

## 2024-04-22 LAB
ALBUMIN SERPL BCG-MCNC: 4.6 G/DL (ref 3.5–5.2)
ALBUMIN UR-MCNC: NEGATIVE MG/DL
ALP SERPL-CCNC: 115 U/L (ref 40–150)
ALT SERPL W P-5'-P-CCNC: 13 U/L (ref 0–50)
AMORPH CRY #/AREA URNS HPF: ABNORMAL /HPF
ANION GAP SERPL CALCULATED.3IONS-SCNC: 9 MMOL/L (ref 7–15)
APPEARANCE UR: CLEAR
AST SERPL W P-5'-P-CCNC: 13 U/L (ref 0–45)
BACTERIA #/AREA URNS HPF: ABNORMAL /HPF
BASOPHILS # BLD AUTO: 0 10E3/UL (ref 0–0.2)
BASOPHILS NFR BLD AUTO: 0 %
BILIRUB SERPL-MCNC: 0.4 MG/DL
BILIRUB UR QL STRIP: NEGATIVE
BUN SERPL-MCNC: 10.6 MG/DL (ref 6–20)
CALCIUM SERPL-MCNC: 10 MG/DL (ref 8.6–10)
CHLORIDE SERPL-SCNC: 104 MMOL/L (ref 98–107)
CLUE CELLS: ABNORMAL
COLOR UR AUTO: ABNORMAL
CREAT SERPL-MCNC: 0.92 MG/DL (ref 0.51–0.95)
DEPRECATED HCO3 PLAS-SCNC: 28 MMOL/L (ref 22–29)
EGFRCR SERPLBLD CKD-EPI 2021: 90 ML/MIN/1.73M2
EOSINOPHIL # BLD AUTO: 0.1 10E3/UL (ref 0–0.7)
EOSINOPHIL NFR BLD AUTO: 1 %
ERYTHROCYTE [DISTWIDTH] IN BLOOD BY AUTOMATED COUNT: 11.4 % (ref 10–15)
GLUCOSE SERPL-MCNC: 97 MG/DL (ref 70–99)
GLUCOSE UR STRIP-MCNC: NEGATIVE MG/DL
HCG UR QL: NEGATIVE
HCT VFR BLD AUTO: 41.8 % (ref 35–47)
HGB BLD-MCNC: 14.9 G/DL (ref 11.7–15.7)
HGB UR QL STRIP: NEGATIVE
HOLD SPECIMEN: NORMAL
IMM GRANULOCYTES # BLD: 0 10E3/UL
IMM GRANULOCYTES NFR BLD: 0 %
KETONES UR STRIP-MCNC: 5 MG/DL
LEUKOCYTE ESTERASE UR QL STRIP: NEGATIVE
LIPASE SERPL-CCNC: 18 U/L (ref 13–60)
LYMPHOCYTES # BLD AUTO: 2.2 10E3/UL (ref 0.8–5.3)
LYMPHOCYTES NFR BLD AUTO: 32 %
MCH RBC QN AUTO: 30.8 PG (ref 26.5–33)
MCHC RBC AUTO-ENTMCNC: 35.6 G/DL (ref 31.5–36.5)
MCV RBC AUTO: 87 FL (ref 78–100)
MONOCYTES # BLD AUTO: 0.4 10E3/UL (ref 0–1.3)
MONOCYTES NFR BLD AUTO: 6 %
NEUTROPHILS # BLD AUTO: 4.1 10E3/UL (ref 1.6–8.3)
NEUTROPHILS NFR BLD AUTO: 60 %
NITRATE UR QL: NEGATIVE
NRBC # BLD AUTO: 0 10E3/UL
NRBC BLD AUTO-RTO: 0 /100
PH UR STRIP: 7 [PH] (ref 5–7)
PLATELET # BLD AUTO: 222 10E3/UL (ref 150–450)
POTASSIUM SERPL-SCNC: 3.9 MMOL/L (ref 3.4–5.3)
PROT SERPL-MCNC: 7.3 G/DL (ref 6.4–8.3)
RBC # BLD AUTO: 4.83 10E6/UL (ref 3.8–5.2)
RBC URINE: 1 /HPF
SODIUM SERPL-SCNC: 141 MMOL/L (ref 135–145)
SP GR UR STRIP: 1.01 (ref 1–1.03)
SQUAMOUS EPITHELIAL: 1 /HPF
TRICHOMONAS, WET PREP: ABNORMAL
UROBILINOGEN UR STRIP-MCNC: NORMAL MG/DL
WBC # BLD AUTO: 6.8 10E3/UL (ref 4–11)
WBC URINE: 1 /HPF
WBC'S/HIGH POWER FIELD, WET PREP: ABNORMAL
YEAST, WET PREP: ABNORMAL

## 2024-04-22 PROCEDURE — 81001 URINALYSIS AUTO W/SCOPE: CPT | Performed by: EMERGENCY MEDICINE

## 2024-04-22 PROCEDURE — 87389 HIV-1 AG W/HIV-1&-2 AB AG IA: CPT | Performed by: EMERGENCY MEDICINE

## 2024-04-22 PROCEDURE — 81025 URINE PREGNANCY TEST: CPT | Performed by: EMERGENCY MEDICINE

## 2024-04-22 PROCEDURE — 58301 REMOVE INTRAUTERINE DEVICE: CPT | Performed by: EMERGENCY MEDICINE

## 2024-04-22 PROCEDURE — 36415 COLL VENOUS BLD VENIPUNCTURE: CPT | Performed by: EMERGENCY MEDICINE

## 2024-04-22 PROCEDURE — 87491 CHLMYD TRACH DNA AMP PROBE: CPT | Performed by: EMERGENCY MEDICINE

## 2024-04-22 PROCEDURE — 250N000011 HC RX IP 250 OP 636: Performed by: EMERGENCY MEDICINE

## 2024-04-22 PROCEDURE — 87661 TRICHOMONAS VAGINALIS AMPLIF: CPT | Performed by: EMERGENCY MEDICINE

## 2024-04-22 PROCEDURE — 99285 EMERGENCY DEPT VISIT HI MDM: CPT | Mod: 25 | Performed by: EMERGENCY MEDICINE

## 2024-04-22 PROCEDURE — 76830 TRANSVAGINAL US NON-OB: CPT

## 2024-04-22 PROCEDURE — 85025 COMPLETE CBC W/AUTO DIFF WBC: CPT | Performed by: EMERGENCY MEDICINE

## 2024-04-22 PROCEDURE — 87210 SMEAR WET MOUNT SALINE/INK: CPT | Performed by: EMERGENCY MEDICINE

## 2024-04-22 PROCEDURE — 250N000013 HC RX MED GY IP 250 OP 250 PS 637: Performed by: EMERGENCY MEDICINE

## 2024-04-22 PROCEDURE — 83690 ASSAY OF LIPASE: CPT | Performed by: EMERGENCY MEDICINE

## 2024-04-22 PROCEDURE — 96365 THER/PROPH/DIAG IV INF INIT: CPT | Mod: 59 | Performed by: EMERGENCY MEDICINE

## 2024-04-22 PROCEDURE — 99284 EMERGENCY DEPT VISIT MOD MDM: CPT | Mod: 25 | Performed by: EMERGENCY MEDICINE

## 2024-04-22 PROCEDURE — 80053 COMPREHEN METABOLIC PANEL: CPT | Performed by: EMERGENCY MEDICINE

## 2024-04-22 RX ORDER — OXYCODONE HYDROCHLORIDE 5 MG/1
5 TABLET ORAL EVERY 6 HOURS PRN
Qty: 10 TABLET | Refills: 0 | Status: SHIPPED | OUTPATIENT
Start: 2024-04-22

## 2024-04-22 RX ORDER — CEFTRIAXONE 500 MG/1
500 INJECTION, POWDER, FOR SOLUTION INTRAMUSCULAR; INTRAVENOUS ONCE
Status: COMPLETED | OUTPATIENT
Start: 2024-04-22 | End: 2024-04-22

## 2024-04-22 RX ORDER — DOXYCYCLINE 100 MG/1
100 CAPSULE ORAL 2 TIMES DAILY
Qty: 14 CAPSULE | Refills: 0 | Status: SHIPPED | OUTPATIENT
Start: 2024-04-22

## 2024-04-22 RX ORDER — OXYCODONE HYDROCHLORIDE 5 MG/1
5 TABLET ORAL ONCE
Status: COMPLETED | OUTPATIENT
Start: 2024-04-22 | End: 2024-04-22

## 2024-04-22 RX ADMIN — OXYCODONE HYDROCHLORIDE 5 MG: 5 TABLET ORAL at 20:05

## 2024-04-22 RX ADMIN — CEFTRIAXONE SODIUM 500 MG: 500 INJECTION, POWDER, FOR SOLUTION INTRAMUSCULAR; INTRAVENOUS at 22:20

## 2024-04-22 ASSESSMENT — ACTIVITIES OF DAILY LIVING (ADL)
ADLS_ACUITY_SCORE: 35
ADLS_ACUITY_SCORE: 35
ADLS_ACUITY_SCORE: 33
ADLS_ACUITY_SCORE: 33

## 2024-04-22 ASSESSMENT — COLUMBIA-SUICIDE SEVERITY RATING SCALE - C-SSRS
6. HAVE YOU EVER DONE ANYTHING, STARTED TO DO ANYTHING, OR PREPARED TO DO ANYTHING TO END YOUR LIFE?: NO
1. IN THE PAST MONTH, HAVE YOU WISHED YOU WERE DEAD OR WISHED YOU COULD GO TO SLEEP AND NOT WAKE UP?: NO
2. HAVE YOU ACTUALLY HAD ANY THOUGHTS OF KILLING YOURSELF IN THE PAST MONTH?: NO

## 2024-04-22 NOTE — ED TRIAGE NOTES
Pt having lower cramping abdominal pain rated 9/10. Cramping and shooting pain across lower abdomen. Has been going on for a few days since period started. Menstrual cycle is irregular due to IUD. Will have minor cramping associated with cycle but has never been this severe.      Triage Assessment (Adult)       Row Name 04/22/24 1832          Triage Assessment    Airway WDL WDL        Respiratory WDL    Respiratory WDL WDL        Skin Circulation/Temperature WDL    Skin Circulation/Temperature WDL WDL        Cardiac WDL    Cardiac WDL WDL        Peripheral/Neurovascular WDL    Peripheral Neurovascular WDL WDL        Cognitive/Neuro/Behavioral WDL    Cognitive/Neuro/Behavioral WDL WDL

## 2024-04-23 ENCOUNTER — TELEPHONE (OUTPATIENT)
Dept: NURSING | Facility: CLINIC | Age: 24
End: 2024-04-23
Payer: COMMERCIAL

## 2024-04-23 LAB
C TRACH DNA SPEC QL PROBE+SIG AMP: POSITIVE
HIV 1+2 AB+HIV1 P24 AG SERPL QL IA: NONREACTIVE
N GONORRHOEA DNA SPEC QL NAA+PROBE: NEGATIVE
T VAGINALIS DNA SPEC QL NAA+PROBE: NOT DETECTED

## 2024-04-23 NOTE — DISCHARGE INSTRUCTIONS
Return to the emergency department for worsening pain, repeated vomiting, fevers, lightheadedness, or other concerns.  Take the antibiotics until they are completely gone.  No sexual intercourse for at least 1 week after you are done being treated.  Follow-up if you are not feeling better over the next 4 to 5 days.  Follow-up with your regular doctor regarding birth control now that your IUD is removed.

## 2024-04-23 NOTE — ED PROVIDER NOTES
History     Chief Complaint   Patient presents with    Abdominal Pain     HPI  Lamar Rosas is a 23 year old female who presents for evaluation of pelvic pain.  The patient says that she has been having cramping over the past week but much worse over the past 3 days, severe pain in the suprapubic region, nonradiating.  She has had some nausea and vomiting off and on.  No fevers she thinks but she has had some chills and sweats.  No diarrhea.  No abnormal vaginal discharge.  She had some blood pressure vaginal bleeding that she describes as spotting several days ago but this stopped.  She is concerned that her IUD is causing an issue.  She has had a new sexual partner.     Allergies:  Allergies   Allergen Reactions    Seasonal Allergies        Problem List:    Patient Active Problem List    Diagnosis Date Noted    History of fracture of nasal bone 11/18/2021     Priority: Medium     Added automatically from request for surgery [id number]      Acquired nasal deformity 11/18/2021     Priority: Medium     Added automatically from request for surgery [id number]      Nasal obstruction 11/17/2021     Priority: Medium    Closed fracture of nasal bone, initial encounter 11/17/2021     Priority: Medium    Deviated nasal septum 11/17/2021     Priority: Medium    Hypertrophy of both inferior nasal turbinates 11/17/2021     Priority: Medium    Moderate major depression (H) 05/12/2021     Priority: Medium    Polysubstance abuse (H) 04/10/2019     Priority: Medium    MARIVEL (generalized anxiety disorder) 07/19/2018     Priority: Medium    Depression 07/17/2018     Priority: Medium    IUD (intrauterine device) in place 06/16/2017     Priority: Medium     mirena placed 6/16/2017            Past Medical History:    No past medical history on file.    Past Surgical History:    No past surgical history on file.    Family History:    Family History   Problem Relation Age of Onset    Allergies Sister         possible seafood allergy.     Substance Abuse Maternal Grandmother     Substance Abuse Maternal Grandfather     Substance Abuse Paternal Grandmother     Substance Abuse Paternal Grandfather     Family History Negative No family hx of         bleeding disorders    Asthma No family hx of     C.A.D. No family hx of     Diabetes No family hx of     Hypertension No family hx of     Cerebrovascular Disease No family hx of     Breast Cancer No family hx of     Cancer - colorectal No family hx of     Prostate Cancer No family hx of        Social History:  Marital Status:  Single [1]  Social History     Tobacco Use    Smoking status: Former     Types: Other    Smokeless tobacco: Never    Tobacco comments:     Nicotene patches   Vaping Use    Vaping status: Former   Substance Use Topics    Alcohol use: Yes     Alcohol/week: 0.0 standard drinks of alcohol     Comment: social    Drug use: Not Currently     Types: Marijuana, Benzodiazepines     Comment: 1 xanax every two weeks        Medications:    doxycycline monohydrate (MONODOX) 100 MG capsule  oxyCODONE (ROXICODONE) 5 MG tablet  gabapentin (NEURONTIN) 300 MG capsule  levonorgestrel (MIRENA) 20 MCG/24HR IUD  ondansetron (ZOFRAN ODT) 4 MG ODT tab  traZODone (DESYREL) 100 MG tablet  valACYclovir (VALTREX) 1000 mg tablet  venlafaxine (EFFEXOR-XR) 150 MG 24 hr capsule          Review of Systems    Physical Exam   BP: 116/59  Pulse: 99  Temp: 98.2  F (36.8  C)  Resp: 16  SpO2: 97 %      Physical Exam  Constitutional:       General: She is in acute distress.      Appearance: She is well-developed.   HENT:      Head: Normocephalic and atraumatic.   Cardiovascular:      Rate and Rhythm: Normal rate.   Pulmonary:      Effort: No respiratory distress.      Breath sounds: No stridor.   Abdominal:      Tenderness: There is abdominal tenderness in the suprapubic area and left lower quadrant.   Genitourinary:     Pubic Area: No rash.       Labia:         Right: No rash, tenderness or lesion.         Left: No rash,  tenderness or lesion.       Cervix: No lesion or erythema.      Comments: IUD removed per patient request  Skin:     General: Skin is warm and dry.   Neurological:      Mental Status: She is alert.         ED [facility]    Foreign Body Removal - Orifice    Date/Time: 4/22/2024 10:19 PM    Performed by: Luis Antonio Little MD  Authorized by: Luis Antonio Little MD    Risks, benefits and alternatives discussed.      LOCATION      Location:  Vagina    PRE PROCEDURE DETAILS     Imaging:  None    ANESTHESIA (see MAR for exact dosages):     Topical anesthetic:  None    PROCEDURE DETAILS      Localization method:  Direct visualization    Removal mechanism:  Ring forceps    Procedure complexity:  Simple    Foreign bodies recovered:  1    Description:  IUD    Intact foreign body removal: yes      POST PROCEDURE DETAILS      Confirmation:  No additional foreign bodies on visualization      PROCEDURE    Patient Tolerance:  Patient tolerated the procedure well with no immediate complications                Critical Care time:  none               Results for orders placed or performed during the hospital encounter of 04/22/24 (from the past 24 hour(s))   CBC with Platelets & Differential    Narrative    The following orders were created for panel order CBC with Platelets & Differential.  Procedure                               Abnormality         Status                     ---------                               -----------         ------                     CBC with platelets and d...[[id number]]                      Final result                 Please view results for these tests on the individual orders.   Comprehensive metabolic panel   Result Value Ref Range    Sodium 141 135 - 145 mmol/L    Potassium 3.9 3.4 - 5.3 mmol/L    Carbon Dioxide (CO2) 28 22 - 29 mmol/L    Anion Gap 9 7 - 15 mmol/L    Urea Nitrogen 10.6 6.0 - 20.0 mg/dL    Creatinine 0.92 0.51 - 0.95 mg/dL    GFR  Estimate 90 >60 mL/min/1.73m2    Calcium 10.0 8.6 - 10.0 mg/dL    Chloride 104 98 - 107 mmol/L    Glucose 97 70 - 99 mg/dL    Alkaline Phosphatase 115 40 - 150 U/L    AST 13 0 - 45 U/L    ALT 13 0 - 50 U/L    Protein Total 7.3 6.4 - 8.3 g/dL    Albumin 4.6 3.5 - 5.2 g/dL    Bilirubin Total 0.4 <=1.2 mg/dL   Lipase   Result Value Ref Range    Lipase 18 13 - 60 U/L   CBC with platelets and differential   Result Value Ref Range    WBC Count 6.8 4.0 - 11.0 10e3/uL    RBC Count 4.83 3.80 - 5.20 10e6/uL    Hemoglobin 14.9 11.7 - 15.7 g/dL    Hematocrit 41.8 35.0 - 47.0 %    MCV 87 78 - 100 fL    MCH 30.8 26.5 - 33.0 pg    MCHC 35.6 31.5 - 36.5 g/dL    RDW 11.4 10.0 - 15.0 %    Platelet Count 222 150 - 450 10e3/uL    % Neutrophils 60 %    % Lymphocytes 32 %    % Monocytes 6 %    % Eosinophils 1 %    % Basophils 0 %    % Immature Granulocytes 0 %    NRBCs per 100 WBC 0 <1 /100    Absolute Neutrophils 4.1 1.6 - 8.3 10e3/uL    Absolute Lymphocytes 2.2 0.8 - 5.3 10e3/uL    Absolute Monocytes 0.4 0.0 - 1.3 10e3/uL    Absolute Eosinophils 0.1 0.0 - 0.7 10e3/uL    Absolute Basophils 0.0 0.0 - 0.2 10e3/uL    Absolute Immature Granulocytes 0.0 <=0.4 10e3/uL    Absolute NRBCs 0.0 10e3/uL   Extra Tube ([place] Draw)    Narrative    The following orders were created for panel order Extra Tube ([place] Draw).  Procedure                               Abnormality         Status                     ---------                               -----------         ------                     Extra Red Top Tube[[id number]]                               Final result                 Please view results for these tests on the individual orders.   Extra Red Top Tube   Result Value Ref Range    Hold Specimen JIC    HCG qualitative urine   Result Value Ref Range    hCG Urine Qualitative Negative Negative   Urine Macroscopic with reflex to Microscopic   Result Value Ref Range    Color Urine Straw Colorless, Straw, Light Yellow, Yellow    Appearance Urine  Clear Clear    Glucose Urine Negative Negative mg/dL    Bilirubin Urine Negative Negative    Ketones Urine 5 (A) Negative mg/dL    Specific Gravity Urine 1.008 1.003 - 1.035    Blood Urine Negative Negative    pH Urine 7.0 5.0 - 7.0    Protein Albumin Urine Negative Negative mg/dL    Urobilinogen Urine Normal Normal, 2.0 mg/dL    Nitrite Urine Negative Negative    Leukocyte Esterase Urine Negative Negative    Bacteria Urine Few (A) None Seen /HPF    RBC Urine 1 <=2 /HPF    WBC Urine 1 <=5 /HPF    Squamous Epithelials Urine 1 <=1 /HPF    Amorphous Crystals Urine Few (A) None Seen /HPF    Narrative    Microscopic not indicated   US Pelvis Cmplt w Transvag & Doppler LmtPel Duplex Limited    Narrative    EXAM: US PELVIS COMPLETE W TRANSVAGINAL AND DOPPLER LIMITED  LOCATION: [facility]  DATE: 4/22/2024    INDICATION: pelvic pain  COMPARISON: None.  TECHNIQUE: Transabdominal scans were performed. Endovaginal ultrasound was performed to better visualize the adnexa. Color flow with spectral Doppler and waveform analysis performed.    FINDINGS:    UTERUS: 6.2 x 4.0 x 3.5 cm. Normal in size and position with no masses. And IUD is present centrally in the uterine fundus.    ENDOMETRIUM: 3 mm. Normal smooth endometrium.    RIGHT OVARY: 3.9 x 2.6 x 2.3 cm. Normal with arterial and venous duplex flow identified.    LEFT OVARY: 3.7 x 2.3 x 2.2 cm. Normal with arterial and venous duplex flow identified.    No significant free fluid.      Impression    IMPRESSION:    1.  Normal pelvic ultrasound.         Wet prep    Specimen: Vagina; Swab   Result Value Ref Range    Trichomonas Absent Absent    Yeast Absent Absent    Clue Cells Absent Absent    WBCs/high power field 1+ (A) None       Medications   oxyCODONE (ROXICODONE) tablet 5 mg (5 mg Oral $Given 4/22/24 2005)   cefTRIAXone (ROCEPHIN) 500 mg vial to attach to  ml bag for ADULTS or NS 50 ml bag for PEDS (0 mg Intravenous Stopped 4/22/24 2240)        Assessments & Plan (with Medical Decision Making)   23-year-old female presents with pelvic pain.  She is given oxycodone for the pain.  White blood cell count is 6.8 and hemoglobin is 14.9.  Electrolytes are within normal limits.  LFTs are normal and lipase is normal, no signs of hepatitis or pancreatitis.  Urine pregnancy test is negative, unlikely ectopic pregnancy.  Urinalysis is negative for signs of blood or signs of infection.  Testing for HIV, gonorrhea, chlamydia, and syphilis are pending.  Ultrasound of the pelvis obtained, images interpreted independently as well as radiology read reviewed, no signs of torsion or tubo-ovarian abscess.  Pelvic exam performed, no signs of zoster on examination.  The patient requested her IUD to be removed and I have done this.  Unclear cause of the patient's symptoms however with the significant discomfort I will treat the patient for pelvic inflammatory disease.  She is given a dose of ceftriaxone here and will be discharged with a short course of oxycodone and a course of doxycycline.  We discussed no sexual intercourse until she is 1 week out from her treatment and that she should have her sexual partner treated as well.  Hospitalization considered due to the patient's symptoms and severe pain however at this time with out signs of a surgical process and with her tolerating oral intake I believe she is safe to discharge home.  She is told to return if she has worsening of her symptoms or other concerns.  Otherwise follow-up in clinic.  We also discussed the importance of her following up regarding birth control and that she should use condoms to protect against infection or pregnancy.  The patient is in agreement with this plan.    I have reviewed the nursing notes.    I have reviewed the findings, diagnosis, plan and need for follow up with the patient.           Medical Decision Making  The patient's presentation was of high complexity (an acute health issue  posing potential threat to life or bodily function).    The patient's evaluation involved:  ordering and/or review of 3+ test(s) in this encounter (see separate area of note for details)  independent interpretation of testing performed by another health professional (see separate area of note for details)    The patient's management necessitated high risk (a decision regarding hospitalization).        Discharge Medication List as of 4/22/2024 10:40 PM        START taking these medications    Details   doxycycline monohydrate (MONODOX) 100 MG capsule Take 1 capsule (100 mg) by mouth 2 times daily, Disp-14 capsule, R-0, [facility]      oxyCODONE (ROXICODONE) 5 MG tablet Take 1 tablet (5 mg) by mouth every 6 hours as needed for severe pain, Disp-10 tablet, R-0, [facility]             Final diagnoses:   PID (acute pelvic inflammatory disease)       4/22/2024   [facility] EMERGENCY DEPT       Luis Antonio Little MD  04/22/24 3687

## 2024-04-23 NOTE — TELEPHONE ENCOUNTER
[facility]    Reason for call: Lab Result Notification     Lab Result (including Rx patient on, if applicable).  If culture, copy of lab report at bottom.  Lab Result:   Component      Latest Ref Rng 4/22/2024  8:40 PM   Chlamydia Trachomatis PCR      Negative  Positive !       Legend:  ! Abnormal    Creatinine Level (mg/dl)   Creatinine   Date Value Ref Range Status   04/22/2024 0.92 0.51 - 0.95 mg/dL Final   05/14/2020 0.78 0.50 - 1.00 mg/dL Final    Creatinine clearance (ml/min), if applicable    Creatinine clearance cannot be calculated (Unknown ideal weight.)     Patient's current Symptoms:   Patient states that he symptoms are improving. Patient information reviewed with patient per protocol. Patient verbalized understanding.     RN Recommendations/Instructions per [facility] ED lab result protocol:   [facility] ED lab result protocol utilized: Chlamydia    Patient/care giver notified to contact your PCP clinic or return to the Emergency department if your:  Symptoms return.  Symptoms do not improve after 3 days on antibiotic.  Symptoms do not resolve after completing antibiotic.  Symptoms worsen or other concerning symptoms.    SPARKLE APARICIO, RN

## 2024-06-23 ENCOUNTER — HEALTH MAINTENANCE LETTER (OUTPATIENT)
Age: 24
End: 2024-06-23

## 2024-12-20 NOTE — PROGRESS NOTES
SUBJECTIVE:   Lamar Rosas is a 17 year old female who presents to clinic today for the following health issues:    Brought into clinic by mother.    ED/UC Followup:    Facility:  [facility]  Date of visit: 7/1/18  Reason for visit: polysubstance abuse  Current Status: no improvement         Abnormal Mood Symptoms  Onset: 1 year and worsening in the past 6 months    Description:   Depression: patient doesn't think she is depressed  Anxiety: YES- patient states she does have some anxiety    Accompanying Signs & Symptoms: quickly overreacts, has no patience, is tired a lot. Mom wonders if fatigue is due to alcohol and drug use that she was not aware of. Alcoholism runs in the family. Mom states she has a poor body image. Patient admits that she thinks she looks fat.   Still participating in activities that you used to enjoy: YES- not as often as she used to   Fatigue: YES  Irritability: YES  Difficulty concentrating: YES  Changes in appetite: no   Problems with sleep: no   Heart racing/beating fast : no   Thoughts of hurting yourself or others: attempt made-cut herself with a razor yesterday    History:   Recent stress: YES- sometimes gets into fights with her friends. Some of her friends get upset with her when she drinks.   Prior depression hospitalization: None  Family history of depression: YES- Mother takes Citalopram  Family history of anxiety: no     Precipitating factors:   Alcohol/drug use: YES- has been drinking and abusing Xanax. Last episode was yesterday. She was brought home by an Uber  passed out. She slept at home for a couple of hours. When she woke up she ended up cutting her arm with a razor because she was mad at her mom. She was taken to [facility]. They were not able to admit her because her mental health was stable. Alcohol and drug abuse are not reasons for admission. [facility] is working on getting Lamar in for chemical assessment. [facility] will be calling to set up time to assess  for ADHD in the next few months    Alleviating factors:  none    Therapies Tried and outcome: alcohol marijuana, and abusing Xanax    Problem list and histories reviewed & adjusted, as indicated.  Additional history: as documented    Current Outpatient Prescriptions   Medication Sig Dispense Refill     levonorgestrel (MIRENA) 20 MCG/24HR IUD 1 each by Intrauterine route once       Allergies   Allergen Reactions     Seasonal Allergies        Reviewed and updated as needed this visit by clinical staff  Tobacco  Allergies  Meds  Problems  Med Hx  Surg Hx  Fam Hx  Soc Hx        Reviewed and updated as needed this visit by Provider  Problems          Here today with Mom. Was seen in the ED yesterday for chemical dependency and self mutilation and possible suicidal ideation. Had been drinking the night before and used marijuana and also xanax. Called Uber to get a ride home and was passed out in car and step dad had to carry her inside. In the AM when woke got into an argument with Mom about cell phone and previous nights events and Lamar grabbed a razor and cut herself multiple times on right arm. No previous cutting prior to this incident. Today is tearful and very remorseful for previous actions. No thoughts of harming self. Would like to follow through with chemical dependency and also mental health eval. Has had concerns in the past of ADHD. Recently was diagnosed PID due to chlamydia. No abdominal pain today. Is not having any trouble sleeping.     ROS:  Review of Systems   Constitutional: Negative for chills, diaphoresis, fatigue and fever.   HENT: Negative for ear discharge, ear pain, hearing loss, rhinorrhea, sinus pressure and sore throat.    Eyes: Negative for discharge and itching.   Respiratory: Negative for cough, shortness of breath and wheezing.    Gastrointestinal: Negative for constipation, diarrhea and nausea.   Skin: Negative for rash.   Neurological: Negative for dizziness, light-headedness  "and headaches.   Psychiatric/Behavioral: Positive for decreased concentration, dysphoric mood and self-injury. Negative for suicidal ideas. The patient is nervous/anxious.         Tearful           OBJECTIVE:     /78 (BP Location: Left arm, Patient Position: Sitting, Cuff Size: Adult Small)  Pulse 76  Temp 97.9  F (36.6  C) (Tympanic)  Resp 12  Ht 5' 6.5\" (1.689 m)  Wt 112 lb 12.8 oz (51.2 kg)  BMI 17.93 kg/m2  Body mass index is 17.93 kg/(m^2).  Physical Exam   Skin:   Left forearm with healing abrasions    Psychiatric: Her mood appears anxious. She exhibits a depressed mood. She expresses no homicidal and no suicidal ideation. She expresses no suicidal plans and no homicidal plans.   Tearful  Poor eye contact        ASSESSMENT/PLAN:   1. Behavior concern  Support and information given   Lamar would benefit from chemical dependency eval, ADHD evaluation and also mental health evaluation. Concern for eating disorder due to low decreasing weight and BMI  - MENTAL HEALTH REFERRAL  - Child/Adolescent; Assessments and Testing, Outpatient Treatment, Psychiatry and Medication Management; General Psychological Assessment; [facility]: (Ages 12 & above) [facility] [phone]; We will contact y...  - CBC with platelets differential  - Comprehensive metabolic panel  - TSH with free T4 reflex  - Vitamin D Deficiency  - Folate  - Vitamin B12  - Zinc  Mom also plans to check with [facility] to see if they can get her in sooner.     During this visit greater than 90% of the 18 minutes for this appointment was spent in counseling and/or coordinating care of above stated issues.     SHADI Hastings [facility]      " oral

## 2025-03-13 ENCOUNTER — HOSPITAL ENCOUNTER (EMERGENCY)
Facility: CLINIC | Age: 25
Discharge: LEFT BEFORE DISCHARGE | End: 2025-03-14
Payer: COMMERCIAL

## 2025-03-13 VITALS
WEIGHT: 130 LBS | TEMPERATURE: 98.5 F | RESPIRATION RATE: 19 BRPM | DIASTOLIC BLOOD PRESSURE: 67 MMHG | HEART RATE: 70 BPM | OXYGEN SATURATION: 100 % | BODY MASS INDEX: 20.4 KG/M2 | HEIGHT: 67 IN | SYSTOLIC BLOOD PRESSURE: 112 MMHG

## 2025-03-13 LAB
AMPHETAMINES UR QL SCN: ABNORMAL
BARBITURATES UR QL SCN: ABNORMAL
BENZODIAZ UR QL SCN: ABNORMAL
BZE UR QL SCN: ABNORMAL
CANNABINOIDS UR QL SCN: ABNORMAL
FENTANYL UR QL: ABNORMAL
FLUAV RNA SPEC QL NAA+PROBE: NEGATIVE
FLUBV RNA RESP QL NAA+PROBE: NEGATIVE
OPIATES UR QL SCN: ABNORMAL
PCP QUAL URINE (ROCHE): ABNORMAL
RSV RNA SPEC NAA+PROBE: NEGATIVE
SARS-COV-2 RNA RESP QL NAA+PROBE: NEGATIVE

## 2025-03-13 PROCEDURE — 87637 SARSCOV2&INF A&B&RSV AMP PRB: CPT | Performed by: EMERGENCY MEDICINE

## 2025-03-13 PROCEDURE — 99281 EMR DPT VST MAYX REQ PHY/QHP: CPT

## 2025-03-13 PROCEDURE — 80307 DRUG TEST PRSMV CHEM ANLYZR: CPT | Performed by: EMERGENCY MEDICINE

## 2025-03-13 ASSESSMENT — COLUMBIA-SUICIDE SEVERITY RATING SCALE - C-SSRS
2. HAVE YOU ACTUALLY HAD ANY THOUGHTS OF KILLING YOURSELF IN THE PAST MONTH?: NO
1. IN THE PAST MONTH, HAVE YOU WISHED YOU WERE DEAD OR WISHED YOU COULD GO TO SLEEP AND NOT WAKE UP?: NO
6. HAVE YOU EVER DONE ANYTHING, STARTED TO DO ANYTHING, OR PREPARED TO DO ANYTHING TO END YOUR LIFE?: NO

## 2025-03-13 ASSESSMENT — ACTIVITIES OF DAILY LIVING (ADL)
ADLS_ACUITY_SCORE: 41
ADLS_ACUITY_SCORE: 41

## 2025-03-14 ENCOUNTER — HOSPITAL ENCOUNTER (EMERGENCY)
Facility: CLINIC | Age: 25
Discharge: PSYCHIATRIC OR SUBSTANCE USE FACILITY | End: 2025-03-15
Attending: FAMILY MEDICINE | Admitting: FAMILY MEDICINE
Payer: COMMERCIAL

## 2025-03-14 DIAGNOSIS — T42.4X4A BENZODIAZEPINE OVERDOSE OF UNDETERMINED INTENT, INITIAL ENCOUNTER: ICD-10-CM

## 2025-03-14 LAB
ALBUMIN SERPL BCG-MCNC: 4.6 G/DL (ref 3.5–5.2)
ALP SERPL-CCNC: 86 U/L (ref 40–150)
ALT SERPL W P-5'-P-CCNC: 14 U/L (ref 0–50)
AMPHETAMINES UR QL SCN: ABNORMAL
ANION GAP SERPL CALCULATED.3IONS-SCNC: 13 MMOL/L (ref 7–15)
APAP SERPL-MCNC: 6.6 UG/ML (ref 10–30)
AST SERPL W P-5'-P-CCNC: 19 U/L (ref 0–45)
BARBITURATES UR QL SCN: ABNORMAL
BASE EXCESS BLDV CALC-SCNC: 1.2 MMOL/L (ref -3–3)
BASOPHILS # BLD AUTO: 0 10E3/UL (ref 0–0.2)
BASOPHILS NFR BLD AUTO: 0 %
BENZODIAZ UR QL SCN: ABNORMAL
BILIRUB SERPL-MCNC: 0.5 MG/DL
BUN SERPL-MCNC: 11.3 MG/DL (ref 6–20)
BZE UR QL SCN: ABNORMAL
CALCIUM SERPL-MCNC: 9.5 MG/DL (ref 8.8–10.4)
CANNABINOIDS UR QL SCN: ABNORMAL
CHLORIDE SERPL-SCNC: 102 MMOL/L (ref 98–107)
CREAT SERPL-MCNC: 1.02 MG/DL (ref 0.51–0.95)
EGFRCR SERPLBLD CKD-EPI 2021: 79 ML/MIN/1.73M2
EOSINOPHIL # BLD AUTO: 0.1 10E3/UL (ref 0–0.7)
EOSINOPHIL NFR BLD AUTO: 1 %
ERYTHROCYTE [DISTWIDTH] IN BLOOD BY AUTOMATED COUNT: 10.9 % (ref 10–15)
ETHANOL SERPL-MCNC: <0.01 G/DL
FENTANYL UR QL: ABNORMAL
GLUCOSE SERPL-MCNC: 75 MG/DL (ref 70–99)
HCG SERPL QL: NEGATIVE
HCO3 BLDV-SCNC: 26 MMOL/L (ref 21–28)
HCO3 SERPL-SCNC: 23 MMOL/L (ref 22–29)
HCT VFR BLD AUTO: 38.4 % (ref 35–47)
HGB BLD-MCNC: 13.9 G/DL (ref 11.7–15.7)
HOLD SPECIMEN: NORMAL
IMM GRANULOCYTES # BLD: 0 10E3/UL
IMM GRANULOCYTES NFR BLD: 0 %
LIPASE SERPL-CCNC: 35 U/L (ref 13–60)
LYMPHOCYTES # BLD AUTO: 2.2 10E3/UL (ref 0.8–5.3)
LYMPHOCYTES NFR BLD AUTO: 33 %
MCH RBC QN AUTO: 31.4 PG (ref 26.5–33)
MCHC RBC AUTO-ENTMCNC: 36.2 G/DL (ref 31.5–36.5)
MCV RBC AUTO: 87 FL (ref 78–100)
MONOCYTES # BLD AUTO: 0.6 10E3/UL (ref 0–1.3)
MONOCYTES NFR BLD AUTO: 9 %
NEUTROPHILS # BLD AUTO: 3.7 10E3/UL (ref 1.6–8.3)
NEUTROPHILS NFR BLD AUTO: 56 %
NRBC # BLD AUTO: 0 10E3/UL
NRBC BLD AUTO-RTO: 0 /100
O2/TOTAL GAS SETTING VFR VENT: 21 %
OPIATES UR QL SCN: ABNORMAL
OXYHGB MFR BLDV: 86 % (ref 70–75)
PCO2 BLDV: 42 MM HG (ref 40–50)
PCP QUAL URINE (ROCHE): ABNORMAL
PH BLDV: 7.4 [PH] (ref 7.32–7.43)
PLATELET # BLD AUTO: 178 10E3/UL (ref 150–450)
PO2 BLDV: 52 MM HG (ref 25–47)
POTASSIUM SERPL-SCNC: 3.7 MMOL/L (ref 3.4–5.3)
PROT SERPL-MCNC: 6.9 G/DL (ref 6.4–8.3)
RBC # BLD AUTO: 4.42 10E6/UL (ref 3.8–5.2)
SALICYLATES SERPL-MCNC: <0.3 MG/DL
SAO2 % BLDV: 87.4 % (ref 70–75)
SODIUM SERPL-SCNC: 138 MMOL/L (ref 135–145)
WBC # BLD AUTO: 6.6 10E3/UL (ref 4–11)

## 2025-03-14 PROCEDURE — 99291 CRITICAL CARE FIRST HOUR: CPT | Mod: 25 | Performed by: FAMILY MEDICINE

## 2025-03-14 PROCEDURE — 80179 DRUG ASSAY SALICYLATE: CPT | Performed by: FAMILY MEDICINE

## 2025-03-14 PROCEDURE — 99291 CRITICAL CARE FIRST HOUR: CPT | Performed by: FAMILY MEDICINE

## 2025-03-14 PROCEDURE — 84703 CHORIONIC GONADOTROPIN ASSAY: CPT | Performed by: FAMILY MEDICINE

## 2025-03-14 PROCEDURE — 36415 COLL VENOUS BLD VENIPUNCTURE: CPT | Performed by: FAMILY MEDICINE

## 2025-03-14 PROCEDURE — 80307 DRUG TEST PRSMV CHEM ANLYZR: CPT | Performed by: FAMILY MEDICINE

## 2025-03-14 PROCEDURE — 80143 DRUG ASSAY ACETAMINOPHEN: CPT | Performed by: FAMILY MEDICINE

## 2025-03-14 PROCEDURE — 250N000011 HC RX IP 250 OP 636: Performed by: FAMILY MEDICINE

## 2025-03-14 PROCEDURE — 93005 ELECTROCARDIOGRAM TRACING: CPT | Performed by: FAMILY MEDICINE

## 2025-03-14 PROCEDURE — 82805 BLOOD GASES W/O2 SATURATION: CPT | Performed by: FAMILY MEDICINE

## 2025-03-14 PROCEDURE — 83690 ASSAY OF LIPASE: CPT | Performed by: FAMILY MEDICINE

## 2025-03-14 PROCEDURE — 85025 COMPLETE CBC W/AUTO DIFF WBC: CPT | Performed by: FAMILY MEDICINE

## 2025-03-14 PROCEDURE — 96374 THER/PROPH/DIAG INJ IV PUSH: CPT | Performed by: FAMILY MEDICINE

## 2025-03-14 PROCEDURE — 80053 COMPREHEN METABOLIC PANEL: CPT | Performed by: FAMILY MEDICINE

## 2025-03-14 PROCEDURE — 82248 BILIRUBIN DIRECT: CPT | Performed by: FAMILY MEDICINE

## 2025-03-14 PROCEDURE — 93010 ELECTROCARDIOGRAM REPORT: CPT | Performed by: FAMILY MEDICINE

## 2025-03-14 PROCEDURE — 96361 HYDRATE IV INFUSION ADD-ON: CPT | Performed by: FAMILY MEDICINE

## 2025-03-14 PROCEDURE — 82077 ASSAY SPEC XCP UR&BREATH IA: CPT | Performed by: FAMILY MEDICINE

## 2025-03-14 PROCEDURE — 258N000003 HC RX IP 258 OP 636: Performed by: FAMILY MEDICINE

## 2025-03-14 RX ORDER — NALOXONE HYDROCHLORIDE 0.4 MG/ML
0.4 INJECTION, SOLUTION INTRAMUSCULAR; INTRAVENOUS; SUBCUTANEOUS
Status: DISCONTINUED | OUTPATIENT
Start: 2025-03-14 | End: 2025-03-15 | Stop reason: HOSPADM

## 2025-03-14 RX ORDER — NALOXONE HYDROCHLORIDE 0.4 MG/ML
0.2 INJECTION, SOLUTION INTRAMUSCULAR; INTRAVENOUS; SUBCUTANEOUS
Status: DISCONTINUED | OUTPATIENT
Start: 2025-03-14 | End: 2025-03-15 | Stop reason: HOSPADM

## 2025-03-14 RX ADMIN — NALOXONE HYDROCHLORIDE 0.4 MG: 0.4 INJECTION, SOLUTION INTRAMUSCULAR; INTRAVENOUS; SUBCUTANEOUS at 22:49

## 2025-03-14 RX ADMIN — SODIUM CHLORIDE 1000 ML: 0.9 INJECTION, SOLUTION INTRAVENOUS at 22:28

## 2025-03-14 ASSESSMENT — ACTIVITIES OF DAILY LIVING (ADL): ADLS_ACUITY_SCORE: 41

## 2025-03-14 NOTE — ED TRIAGE NOTES
Pt c/o general weakness and anxiety for 1 week. Pt is at treatment facility currently for sober living. Staff at bedside. Pt states she needs [facility] drug screen and flu test done so she does not get kicked out of her treatment facility.      Triage Assessment (Adult)       Row Name 03/13/25 1892          Triage Assessment    Airway WDL WDL        Respiratory WDL    Respiratory WDL WDL        Skin Circulation/Temperature WDL    Skin Circulation/Temperature WDL WDL        Cardiac WDL    Cardiac WDL WDL        Peripheral/Neurovascular WDL    Peripheral Neurovascular WDL WDL        Cognitive/Neuro/Behavioral WDL    Cognitive/Neuro/Behavioral WDL WDL

## 2025-03-15 ENCOUNTER — HOSPITAL ENCOUNTER (INPATIENT)
Facility: CLINIC | Age: 25
LOS: 5 days | Discharge: HOME | End: 2025-03-20
Attending: PSYCHIATRY & NEUROLOGY | Admitting: PSYCHIATRY & NEUROLOGY
Payer: COMMERCIAL

## 2025-03-15 ENCOUNTER — TELEPHONE (OUTPATIENT)
Dept: BEHAVIORAL HEALTH | Facility: CLINIC | Age: 25
End: 2025-03-15
Payer: COMMERCIAL

## 2025-03-15 VITALS
DIASTOLIC BLOOD PRESSURE: 84 MMHG | OXYGEN SATURATION: 99 % | RESPIRATION RATE: 19 BRPM | TEMPERATURE: 98.5 F | SYSTOLIC BLOOD PRESSURE: 131 MMHG | HEART RATE: 88 BPM

## 2025-03-15 DIAGNOSIS — F41.1 GAD (GENERALIZED ANXIETY DISORDER): Primary | ICD-10-CM

## 2025-03-15 PROBLEM — T42.4X2A INTENTIONAL BENZODIAZEPINE OVERDOSE (H): Status: ACTIVE | Noted: 2025-03-15

## 2025-03-15 PROBLEM — T14.91XA SUICIDE ATTEMPT (H): Status: ACTIVE | Noted: 2025-03-15

## 2025-03-15 PROBLEM — F33.2 SEVERE EPISODE OF RECURRENT MAJOR DEPRESSIVE DISORDER, WITHOUT PSYCHOTIC FEATURES (H): Status: ACTIVE | Noted: 2025-03-15

## 2025-03-15 LAB
ALBUMIN SERPL BCG-MCNC: 4.1 G/DL (ref 3.5–5.2)
ALBUMIN SERPL BCG-MCNC: 4.6 G/DL (ref 3.5–5.2)
ALP SERPL-CCNC: 74 U/L (ref 40–150)
ALP SERPL-CCNC: 86 U/L (ref 40–150)
ALT SERPL W P-5'-P-CCNC: 13 U/L (ref 0–50)
ALT SERPL W P-5'-P-CCNC: 14 U/L (ref 0–50)
APAP SERPL-MCNC: <5 UG/ML (ref 10–30)
AST SERPL W P-5'-P-CCNC: 15 U/L (ref 0–45)
AST SERPL W P-5'-P-CCNC: 19 U/L (ref 0–45)
ATRIAL RATE - MUSE: 75 BPM
BILIRUB DIRECT SERPL-MCNC: 0.14 MG/DL (ref 0–0.3)
BILIRUB DIRECT SERPL-MCNC: 0.15 MG/DL (ref 0–0.3)
BILIRUB SERPL-MCNC: 0.5 MG/DL
BILIRUB SERPL-MCNC: 0.5 MG/DL
DIASTOLIC BLOOD PRESSURE - MUSE: NORMAL MMHG
INTERPRETATION ECG - MUSE: NORMAL
P AXIS - MUSE: 79 DEGREES
PR INTERVAL - MUSE: 186 MS
PROT SERPL-MCNC: 6 G/DL (ref 6.4–8.3)
PROT SERPL-MCNC: 6.9 G/DL (ref 6.4–8.3)
QRS DURATION - MUSE: 84 MS
QT - MUSE: 378 MS
QTC - MUSE: 422 MS
R AXIS - MUSE: 84 DEGREES
SYSTOLIC BLOOD PRESSURE - MUSE: NORMAL MMHG
T AXIS - MUSE: 63 DEGREES
VENTRICULAR RATE- MUSE: 75 BPM

## 2025-03-15 PROCEDURE — 36415 COLL VENOUS BLD VENIPUNCTURE: CPT | Performed by: EMERGENCY MEDICINE

## 2025-03-15 PROCEDURE — 128N000002 HC R&B CD/MH ADOLESCENT

## 2025-03-15 PROCEDURE — 80076 HEPATIC FUNCTION PANEL: CPT | Performed by: EMERGENCY MEDICINE

## 2025-03-15 PROCEDURE — 250N000013 HC RX MED GY IP 250 OP 250 PS 637: Performed by: EMERGENCY MEDICINE

## 2025-03-15 PROCEDURE — 80143 DRUG ASSAY ACETAMINOPHEN: CPT | Performed by: FAMILY MEDICINE

## 2025-03-15 RX ORDER — MAGNESIUM HYDROXIDE/ALUMINUM HYDROXICE/SIMETHICONE 120; 1200; 1200 MG/30ML; MG/30ML; MG/30ML
30 SUSPENSION ORAL EVERY 4 HOURS PRN
Status: DISCONTINUED | OUTPATIENT
Start: 2025-03-15 | End: 2025-03-20 | Stop reason: HOSPADM

## 2025-03-15 RX ORDER — GABAPENTIN 300 MG/1
300 CAPSULE ORAL 3 TIMES DAILY
Status: DISCONTINUED | OUTPATIENT
Start: 2025-03-15 | End: 2025-03-15

## 2025-03-15 RX ORDER — GABAPENTIN 100 MG/1
300 CAPSULE ORAL 3 TIMES DAILY
Status: DISCONTINUED | OUTPATIENT
Start: 2025-03-16 | End: 2025-03-16

## 2025-03-15 RX ORDER — ACETAMINOPHEN 325 MG/1
650 TABLET ORAL EVERY 4 HOURS PRN
Status: DISCONTINUED | OUTPATIENT
Start: 2025-03-15 | End: 2025-03-20 | Stop reason: HOSPADM

## 2025-03-15 RX ORDER — OLANZAPINE 10 MG/2ML
10 INJECTION, POWDER, FOR SOLUTION INTRAMUSCULAR 3 TIMES DAILY PRN
Status: DISCONTINUED | OUTPATIENT
Start: 2025-03-15 | End: 2025-03-20 | Stop reason: HOSPADM

## 2025-03-15 RX ORDER — VENLAFAXINE HYDROCHLORIDE 150 MG/1
150 CAPSULE, EXTENDED RELEASE ORAL
Status: DISCONTINUED | OUTPATIENT
Start: 2025-03-16 | End: 2025-03-20 | Stop reason: HOSPADM

## 2025-03-15 RX ORDER — TRAZODONE HYDROCHLORIDE 150 MG/1
1 TABLET ORAL AT BEDTIME
COMMUNITY
Start: 2025-02-10

## 2025-03-15 RX ORDER — OLANZAPINE 5 MG/1
5 TABLET, ORALLY DISINTEGRATING ORAL AT BEDTIME
Status: DISCONTINUED | OUTPATIENT
Start: 2025-03-15 | End: 2025-03-15

## 2025-03-15 RX ORDER — VENLAFAXINE HYDROCHLORIDE 150 MG/1
150 CAPSULE, EXTENDED RELEASE ORAL
Status: DISCONTINUED | OUTPATIENT
Start: 2025-03-15 | End: 2025-03-15 | Stop reason: HOSPADM

## 2025-03-15 RX ORDER — AMOXICILLIN 250 MG
1 CAPSULE ORAL 2 TIMES DAILY PRN
Status: DISCONTINUED | OUTPATIENT
Start: 2025-03-15 | End: 2025-03-20 | Stop reason: HOSPADM

## 2025-03-15 RX ORDER — OLANZAPINE 5 MG/1
5 TABLET, ORALLY DISINTEGRATING ORAL ONCE
Status: COMPLETED | OUTPATIENT
Start: 2025-03-15 | End: 2025-03-15

## 2025-03-15 RX ORDER — GABAPENTIN 800 MG/1
1 TABLET ORAL
Status: ON HOLD | COMMUNITY
Start: 2025-02-10 | End: 2025-03-20

## 2025-03-15 RX ORDER — HYDROXYZINE HYDROCHLORIDE 25 MG/1
25 TABLET, FILM COATED ORAL EVERY 4 HOURS PRN
Status: DISCONTINUED | OUTPATIENT
Start: 2025-03-15 | End: 2025-03-18

## 2025-03-15 RX ORDER — GABAPENTIN 400 MG/1
800 CAPSULE ORAL 3 TIMES DAILY
Status: DISCONTINUED | OUTPATIENT
Start: 2025-03-15 | End: 2025-03-15

## 2025-03-15 RX ORDER — OLANZAPINE 10 MG/1
10 TABLET ORAL 3 TIMES DAILY PRN
Status: DISCONTINUED | OUTPATIENT
Start: 2025-03-15 | End: 2025-03-20 | Stop reason: HOSPADM

## 2025-03-15 RX ADMIN — VENLAFAXINE HYDROCHLORIDE 150 MG: 150 CAPSULE, EXTENDED RELEASE ORAL at 12:34

## 2025-03-15 RX ADMIN — OLANZAPINE 5 MG: 5 TABLET, ORALLY DISINTEGRATING ORAL at 12:34

## 2025-03-15 RX ADMIN — NICOTINE POLACRILEX 2 MG: 2 GUM, CHEWING BUCCAL at 10:39

## 2025-03-15 RX ADMIN — GABAPENTIN 800 MG: 100 CAPSULE ORAL at 12:34

## 2025-03-15 ASSESSMENT — ACTIVITIES OF DAILY LIVING (ADL)
ADLS_ACUITY_SCORE: 18
ADLS_ACUITY_SCORE: 41
ADLS_ACUITY_SCORE: 18
ADLS_ACUITY_SCORE: 41
ADLS_ACUITY_SCORE: 41
ADLS_ACUITY_SCORE: 18
ADLS_ACUITY_SCORE: 41

## 2025-03-15 NOTE — TELEPHONE ENCOUNTER
"S: [facility] ED , DEC  Christianne  calling at 11:01 AM about a 24 year old/Female presenting post od.      B: Pt arrived via Family. Presenting problem, stressors: she od'ed on 10 xanax yest that she got \"off the street.\" She denied to the  that this was a SA, saying she was trying to get high, but had told both pa's that it was a SA. She initially said she took 10 pills but later said it was 20-30 pills. She said she took 10 pills yest during the day and took 10-20 again late last night. Stressor is dep and anxiety and substance abuse.      Pt affect in ED: Flat  Pt Dx: Major Depressive Disorder, Generalized Anxiety Disorder, and Substance Use Disorder: (polysubstance use d/o) and agorophobia  Previous [facility] hx? Yes: last was 2 yrs ago in [place]  Pt denies SI   Hx of suicide attempt? Yes: 2 yrs ago by od and possibly others, per her mom   Pt denies SIB  Pt denies HI   Pt denies hallucinations .   Pt RARS Score: 3    Hx of aggression/violence, sexual offenses, legal concerns, Epic care plan? describe: no  Current concerns for aggression this visit? No  Does pt have a history of Civil Commitment? No  Is Pt their own guardian? Yes    Pt is prescribed medication. Is patient medication compliant? Yes  Pt endorses OP services: Psychiatrist  CD concerns:  rx pills, unknown info but her mom said she found several pills in pt's purse, possibly Fentanyl; last use of etoh Jan 16 per pt, unk amt  She said she had been sober for 1.5 yrs but relapsed with rx pills and etoh in November but then sober from etoh since Jan   Acute or chronic medical concerns: no  Does Pt present with specific needs, assistive devices, or exclusionary criteria? None      Pt is ambulatory  Pt is able to perform ADLs independently      A: Pt to be reviewed for IP admission. Pt is Voluntary  Preferred placement: [facility]    COVID Symptoms: No  If yes, COVID test required   Utox: Positive for amphet's and benzos    CMP: Abnormalities: creatinine " 1.02  CBC: WNL  HCG: Negative    R: Patient cleared and ready for behavioral bed placement: Yes and cleared by poison control   Pt placed on [facility] worklist? Yes    Does Patient need a Transfer Center request created? Yes, writer completed Transfer Center request at:  11:11 am    Paged Joel at 11:33 am.    Per Joel at 12:41 pm, she accepts pt for herself.     manuele/Joel accepted for herself; Clive on unit informed at 12:53 pm; Mildred in er informed at 12:54 pm and author requested that she ask the ED provider to write transfer orders

## 2025-03-15 NOTE — ED PROVIDER NOTES
Patient is a 24 year old here after ingesting benzodiazepines. Please see previous providers note.    I met the patient when I walked in this morning.  She was on her bed vaping and awake.    Patient was signed out to me by previous provider.  Apparently the patient is intermittently awake but sleeping when asked to do things.    DEC  came to assess the patient however patient did not comply.    Mom called and asked for an update however patient did not give permission for this.    DEC assessment eventually happened.  DEC recommends voluntary inpatient psych for suicidal ideation and suicide attempt with ingestion.  Patient is agreeable at this time but would hold as needed.  Placed the order for inpatient psych.  Awaiting transfer        Debbie Cleveland,   03/15/25 1100       Debbie Rodriguez, DO  03/15/25 [id number]

## 2025-03-15 NOTE — ED PROVIDER NOTES
ED signout note:    In short, 24-year-old female with history of polysubstance abuse, history of prior benzodiazepine abuse/overdose, presenting the emergency department with concerns regarding overdose.  Patient recently at [facility] 1 week ago for benzodiazepine abuse.  Patient denies that this was a suicide attempt.    Patient signed out pending LFTs, and acetaminophen at 4-hour level.  Also pending DEC evaluation when clinically sober, and able to participate in DEC evaluation.    Repeat acetaminophen level negative.  No further testing or treatment indicated with regards to Tylenol.  LFTs had been normal as well.    Plan is for DEC evaluation.  Signed out to daytime provider pending DEC evaluation.    1. Benzodiazepine overdose of undetermined intent, initial encounter           Bakari Sahni MD  03/15/25 3597

## 2025-03-15 NOTE — ED PROVIDER NOTES
"  History     Chief Complaint   Patient presents with    Drug Overdose   Drug overdose  HPI  Lamar Rosas is a 24 year old female, past medical history is significant for depression, polysubstance abuse, generalized anxiety disorder, depression, emergency department concerns of benzodiazepine overdose; clonazepam (some of her own recently prescribed a week ago and some from the \"dark web\") and Xanax(off the dark web as well as mom states she has never had Xanax prescribed to her).  Mom states that the patient was recently at [place] for benzodiazepine abuse about a week ago.  Mom states that she is taken up with some unsavory character who likely facilitated her access to additional benzodiazepines without prescription.  Ingestion was approximately an hour or 2 ago they are not really sure.  They do not know how much she took.  They do not think that she has been drinking alcohol as well but I note upon reviewing the EHR that she has a history for alcohol intoxication acutely in 11/28/2024, 11/10/2024 within our system.  When I asked the patient why she took Xanax and clonazepam she shook her head that this was not a suicide attempt.  Mom expressed considerable frustration that they have been unable to get her the help that they need with mental illness.    Allergies:  Allergies   Allergen Reactions    Seasonal Allergies        Problem List:    Patient Active Problem List    Diagnosis Date Noted    History of fracture of nasal bone 11/18/2021     Priority: Medium     Added automatically from request for surgery [id number]      Acquired nasal deformity 11/18/2021     Priority: Medium     Added automatically from request for surgery [id number]      Nasal obstruction 11/17/2021     Priority: Medium    Closed fracture of nasal bone, initial encounter 11/17/2021     Priority: Medium    Deviated nasal septum 11/17/2021     Priority: Medium    Hypertrophy of both inferior nasal turbinates 11/17/2021     Priority: Medium    " Moderate major depression (H) 05/12/2021     Priority: Medium    Polysubstance abuse (H) 04/10/2019     Priority: Medium    MARIVEL (generalized anxiety disorder) 07/19/2018     Priority: Medium    Depression 07/17/2018     Priority: Medium    IUD (intrauterine device) in place 06/16/2017     Priority: Medium     mirena placed 6/16/2017            Past Medical History:    No past medical history on file.    Past Surgical History:    No past surgical history on file.    Family History:    Family History   Problem Relation Age of Onset    Allergies Sister         possible seafood allergy.    Substance Abuse Maternal Grandmother     Substance Abuse Maternal Grandfather     Substance Abuse Paternal Grandmother     Substance Abuse Paternal Grandfather     Family History Negative No family hx of         bleeding disorders    Asthma No family hx of     C.A.D. No family hx of     Diabetes No family hx of     Hypertension No family hx of     Cerebrovascular Disease No family hx of     Breast Cancer No family hx of     Cancer - colorectal No family hx of     Prostate Cancer No family hx of        Social History:  Marital Status:  Single [1]  Social History     Tobacco Use    Smoking status: Former     Types: Other    Smokeless tobacco: Never    Tobacco comments:     Nicotene patches   Vaping Use    Vaping status: Former   Substance Use Topics    Alcohol use: Yes     Alcohol/week: 0.0 standard drinks of alcohol     Comment: social    Drug use: Not Currently     Types: Marijuana, Benzodiazepines     Comment: 1 xanax every two weeks        Medications:    doxycycline monohydrate (MONODOX) 100 MG capsule  gabapentin (NEURONTIN) 300 MG capsule  levonorgestrel (MIRENA) 20 MCG/24HR IUD  ondansetron (ZOFRAN ODT) 4 MG ODT tab  oxyCODONE (ROXICODONE) 5 MG tablet  traZODone (DESYREL) 100 MG tablet  valACYclovir (VALTREX) 1000 mg tablet  venlafaxine (EFFEXOR-XR) 150 MG 24 hr capsule          Review of Systems   All other systems reviewed and  are negative.      Physical Exam   BP: 116/78  Pulse: 77  Temp: 98.5  F (36.9  C)  Resp: 27  SpO2: 97 %      Physical Exam  Vitals and nursing note reviewed.   Constitutional:       General: She is not in acute distress.     Appearance: Normal appearance. She is not ill-appearing.      Comments: Cachectic appearing, drowsy, arousable to verbal stimuli.   HENT:      Head: Normocephalic and atraumatic.      Right Ear: Tympanic membrane, ear canal and external ear normal.      Left Ear: Tympanic membrane, ear canal and external ear normal.      Nose: Nose normal.      Mouth/Throat:      Mouth: Mucous membranes are dry.      Pharynx: Oropharynx is clear.      Comments: Gag reflex intact at the time of initial evaluation.  Eyes:      Extraocular Movements: Extraocular movements intact.      Conjunctiva/sclera: Conjunctivae normal.      Pupils: Pupils are equal, round, and reactive to light.   Cardiovascular:      Rate and Rhythm: Normal rate and regular rhythm.      Pulses: Normal pulses.      Heart sounds: Normal heart sounds.   Pulmonary:      Effort: Pulmonary effort is normal.      Breath sounds: Normal breath sounds.   Abdominal:      General: Bowel sounds are normal.      Palpations: Abdomen is soft.   Musculoskeletal:         General: Normal range of motion.      Cervical back: Normal range of motion and neck supple.   Skin:     General: Skin is warm and dry.      Capillary Refill: Capillary refill takes less than 2 seconds.   Neurological:      Mental Status: She is alert.         ED Course        Procedures  10:39 PM  I spoke with Betty at poison control regarding this patient.  She recommended monitoring for at least 4 hours and also to consider the possibility that street acquired benzodiazepines such as Xanax are often fentanyl or laced with fentanyl.  Could try dose of Narcan and observe for effect with the patient.  If the patient is unable to keep her sats up or has airway protection issues going forward  could consider reversal with flumazenil and also the potential for intubation for airway protection.  Agrees with lab diagnostics ordered thus far.  I spoke with the patient's parents regarding this.  My plan for her will be to watch her for at least 4 hours on the recommendation of poison control given clonazepam and Xanax perhaps longer.  When her sensorium is cleared from this I would like her to speak with the DEC before she goes anywhere.  Parents are in full agreement with the plan.    11:24 PM  At my request the patient's nurse administered 0.4 mg IV of Narcan.  This had no effect on the patient's level of consciousness.  She awoke a short time later to request a sandwich.  This was denied for the present time.    12:53 AM  Rechecked on the patient.  Sleeping, good oxygen saturation normal respiratory rate easily arousable.  Reviewed resulted lab diagnostics.  The patient denies taking acetaminophen however her acetaminophen level at presentation is 6.6.  Will plan for repeat level in 4 hours.  Continued observation and plan for the patient to speak with the DEC .      1:03 AM  At shift change this patient was discussed with my colleague Dr. Bakari Sahni who will follow-up on the repeat Tylenol level planned in 4 hours.  DEC assessment to follow after clearance of sensorium.                EKG Interpretation:      Interpreted by Carroll Brothers MD  Time reviewed: Time obtained 2227 time interpreted same 75 bpm sinus rhythm normal axis, normal intervals, no acute ST-T wave changes.        Critical Care time:  was 49 minutes for this patient excluding procedures.         Results for orders placed or performed during the hospital encounter of 03/14/25 (from the past 24 hours)   CBC with platelets, differential    Narrative    The following orders were created for panel order CBC with platelets, differential.  Procedure                               Abnormality         Status                      ---------                               -----------         ------                     CBC with platelets and ...[[id number]]                      Final result                 Please view results for these tests on the individual orders.   Comprehensive metabolic panel   Result Value Ref Range    Sodium 138 135 - 145 mmol/L    Potassium 3.7 3.4 - 5.3 mmol/L    Carbon Dioxide (CO2) 23 22 - 29 mmol/L    Anion Gap 13 7 - 15 mmol/L    Urea Nitrogen 11.3 6.0 - 20.0 mg/dL    Creatinine 1.02 (H) 0.51 - 0.95 mg/dL    GFR Estimate 79 >60 mL/min/1.73m2    Calcium 9.5 8.8 - 10.4 mg/dL    Chloride 102 98 - 107 mmol/L    Glucose 75 70 - 99 mg/dL    Alkaline Phosphatase 86 40 - 150 U/L    AST 19 0 - 45 U/L    ALT 14 0 - 50 U/L    Protein Total 6.9 6.4 - 8.3 g/dL    Albumin 4.6 3.5 - 5.2 g/dL    Bilirubin Total 0.5 <=1.2 mg/dL   Lipase   Result Value Ref Range    Lipase 35 13 - 60 U/L   Blood gas venous   Result Value Ref Range    pH Venous 7.40 7.32 - 7.43    pCO2 Venous 42 40 - 50 mm Hg    pO2 Venous 52 (H) 25 - 47 mm Hg    Bicarbonate Venous 26 21 - 28 mmol/L    Base Excess/Deficit Venous 1.2 -3.0 - 3.0 mmol/L    FIO2 21     Oxyhemoglobin Venous 86 (H) 70 - 75 %    O2 Sat, Venous 87.4 (H) 70.0 - 75.0 %    Narrative    In healthy individuals, oxyhemoglobin (O2Hb) and oxygen saturation (SO2) are approximately equal. In the presence of dyshemoglobins, oxyhemoglobin can be considerably lower than oxygen saturation.   HCG qualitative pregnancy (blood)   Result Value Ref Range    hCG Serum Qualitative Negative Negative   Alcohol level blood   Result Value Ref Range    Alcohol ethyl <0.01 <=0.01 g/dL   Salicylate level   Result Value Ref Range    Salicylate <0.3   mg/dL   Acetaminophen level   Result Value Ref Range    Acetaminophen 6.6 (L) 10.0 - 30.0 ug/mL   [place] Draw    Narrative    The following orders were created for panel order [place] Draw.  Procedure                               Abnormality         Status                      ---------                               -----------         ------                     Extra Blue Top Tube[[id number]]                             Final result                 Please view results for these tests on the individual orders.   CBC with platelets and differential   Result Value Ref Range    WBC Count 6.6 4.0 - 11.0 10e3/uL    RBC Count 4.42 3.80 - 5.20 10e6/uL    Hemoglobin 13.9 11.7 - 15.7 g/dL    Hematocrit 38.4 35.0 - 47.0 %    MCV 87 78 - 100 fL    MCH 31.4 26.5 - 33.0 pg    MCHC 36.2 31.5 - 36.5 g/dL    RDW 10.9 10.0 - 15.0 %    Platelet Count 178 150 - 450 10e3/uL    % Neutrophils 56 %    % Lymphocytes 33 %    % Monocytes 9 %    % Eosinophils 1 %    % Basophils 0 %    % Immature Granulocytes 0 %    NRBCs per 100 WBC 0 <1 /100    Absolute Neutrophils 3.7 1.6 - 8.3 10e3/uL    Absolute Lymphocytes 2.2 0.8 - 5.3 10e3/uL    Absolute Monocytes 0.6 0.0 - 1.3 10e3/uL    Absolute Eosinophils 0.1 0.0 - 0.7 10e3/uL    Absolute Basophils 0.0 0.0 - 0.2 10e3/uL    Absolute Immature Granulocytes 0.0 <=0.4 10e3/uL    Absolute NRBCs 0.0 10e3/uL   Extra Blue Top Tube   Result Value Ref Range    Hold Specimen [facility]    EKG 12 lead   Result Value Ref Range    Systolic Blood Pressure  mmHg    Diastolic Blood Pressure  mmHg    Ventricular Rate 75 BPM    Atrial Rate 75 BPM    IA Interval 186 ms    QRS Duration 84 ms     ms    QTc 422 ms    P Axis 79 degrees    R AXIS 84 degrees    T Axis 63 degrees    Interpretation ECG       Sinus rhythm  Normal ECG  No previous ECGs available     Urine Drug Screen    Narrative    The following orders were created for panel order Urine Drug Screen.  Procedure                               Abnormality         Status                     ---------                               -----------         ------                     Urine Drug Screen Panel[[id number]]     Abnormal            Final result                 Please view results for these tests on the individual orders.   Urine  Drug Screen Panel   Result Value Ref Range    Amphetamines Urine Screen Positive (A) Screen Negative    Barbituates Urine Screen Negative Screen Negative    Benzodiazepine Urine Screen Positive (A) Screen Negative    Cannabinoids Urine Screen Negative Screen Negative    Cocaine Urine Screen Negative Screen Negative    Fentanyl Qual Urine Screen Negative Screen Negative    Opiates Urine Screen Negative Screen Negative    PCP Urine Screen Negative Screen Negative       Medications   naloxone (NARCAN) injection 0.2 mg ( Intravenous See Alternative 3/14/25 2249)     Or   naloxone (NARCAN) injection 0.4 mg (0.4 mg Intravenous $Given 3/14/25 2249)     Or   naloxone (NARCAN) injection 0.2 mg ( Intramuscular See Alternative 3/14/25 2249)     Or   naloxone (NARCAN) injection 0.4 mg ( Intramuscular See Alternative 3/14/25 2249)   sodium chloride 0.9% BOLUS 1,000 mL (0 mLs Intravenous Stopped 3/15/25 0034)       Assessments & Plan (with Medical Decision Making)   Assessment and plan with medical decision making the time stamp above.      Disclaimer: This note consists of symbols derived from keyboarding, dictation and/or voice recognition software. As a result, there may be errors in the script that have gone undetected. Please consider this when interpreting information found in this chart.      I have reviewed the nursing notes.    I have reviewed the findings, diagnosis, plan and need for follow up with the patient.        New Prescriptions    No medications on file       Final diagnoses:   Benzodiazepine overdose of undetermined intent, initial encounter       3/14/2025   [facility] EMERGENCY DEPT       Carroll Brothers MD  03/17/25 0106

## 2025-03-15 NOTE — ED NOTES
Pt' wants to leave to go to a sober house. Pt said her Mom is coming here in 10 minutes and the sober house is picking her up in 20 minutes. This writer requested Dr. Rodriguez come and talk to the patient. Dr. Rordiguez told her she needs to talk with her Mom when her Mom gets here.

## 2025-03-15 NOTE — CONSULTS
"Diagnostic Evaluation Consultation  Crisis Assessment    Patient Name: Lamar Rosas  Age:  24 year old  Legal Sex: female  Gender Identity: female  Pronouns:   Race: White  Ethnicity: Choose not to answer  Language: English      Patient was assessed: Virtual: [employer]   Crisis Assessment Start Date: 03/15/25  Crisis Assessment Start Time: 1001  Crisis Assessment Stop Time: 1022  Patient location: [facility] Emergency Dept                             ED05    Referral Data and Chief Complaint  Lamar Rosas presents to the ED with family/friends (pt's mom brought pt to ED). Patient is presenting to the ED for the following concerns: Other (see comment), Anxiety, Depression, Substance use (drug benzodiazepine overdose). Factors that make the mental health crisis life threatening or complex are: Pt arrived at the ED after overdosing and ingesting 20 plus xanax and other possible \"street drug\" pills yesterday and later on again yesterday evening. Pt denied having SI intent when interviewed. Collateral information was provided from pt's mother who reported that pt admitted to being suicidal and having suicidal intent when taking the pills. Pt's mother is a pharmacy tech and expressed further concerns after finding several pills in pt's purse that consisted of a combination of prescribed and street pills, some that she felt could likely be laced with fentanyl.  Pt has a hx of anxiety, depression and polysubstance abuse was recently attending treatment for benzodiazepine abuse. Pt provided minimal constricted information and her speech was difficult to understand during the interview, but was able to report of having increased depression and anxiety symptoms. Pt reported that she felt she was discharged from treatment too early. Pt has a hx of previous overdose attempts and hospitalizations..      Informed Consent and Assessment Methods  Explained the crisis assessment process, including applicable information " disclosures and limits to confidentiality, assessed understanding of the process, and obtained consent to proceed with the assessment.  Assessment methods included conducting a formal interview with patient, review of medical records, collaboration with medical staff, and obtaining relevant collateral information from family and community providers when available.  : done     History of the Crisis   Pt has a hx of being diagnosed with MDD, MARIVEL and polysubstance abuse. Pt was sober for a year and half prior to relapsing late last year. Pt has been to two treatment programs since then and continues to struggle with worsening depression and anxiety. Pt has a hx of multiple overdose attempts and was hospitalized in [place] two years ago due to overdose. Pt's mother provided further collateral and reported that she feels pt's symptoms are at their worst and is concerned that she wont be alive and will go back to using drugs-overdosing if discharged without mental health treatment.    Brief Psychosocial History  Family:  Single, Children no  Support System:  Parent(s)  Employment Status:  other (see comments) (unable to assess)  Source of Income:     Financial Environmental Concerns:  other (see comments) (identified having some financial stressors)  Current Hobbies:  family functions  Barriers in Personal Life:  mental health concerns    Significant Clinical History  Current Anxiety Symptoms:  anxious  Current Depression/Trauma:  avoidance, difficulty concentrating, impaired decision making, sadness, withdrawl/isolation  Current Somatic Symptoms:  anxious  Current Psychosis/Thought Disturbance:  impulsive, high risk behavior  Current Eating Symptoms:     Chemical Use History:  Alcohol: Other (comments) (hx of polysubstance abuse-alcohol, reports of not having any alcohol past two months)  Last Use:: 01/16/25  Benzodiazepines: Street buy drugs (reports of taking about 9-10 Xanax yesterday evening)  Last Use::  "03/14/25  Opiates: None  Cocaine: None  Other Use: None   Past diagnosis:  Anxiety Disorder, Depression, Substance Use Disorder (hx of polysubstance abuse)  Family history:  No known history of mental health or chemical health concerns  Past treatment:  Inpatient Hospitalization, Psychiatric Medication Management  Details of most recent treatment:  recently at [facility] facility 1 week ago for benzodiazepine abuse  Other relevant history:       Have there been any medication changes in the past two weeks:  no       Is the patient compliant with medications:           Collateral Information  Is there collateral information: Yes     Collateral information name, relationship, phone number:  Abigail Rosas (Mother) [phone]    What happened today: She reported that pt was kicked out of her sober living home and treatment program due to not attending. She stayed at their place last night and her mom found her cupping water in her mouth in the bathroom and she asked her what she was taking. Pt told her that she took 10 Xanx pills and had more pills in her hand. She proceeded to bring her to the ED. While on the way there, pt told her parents that she had suicidal intent when taking the pills and that she probably took more than 10 as she took 10 prior to going to stay with them earlier in the day and took 10-20 after that while at their place.     What is different about patient's functioning: She reported that pt has dealth with \"horrible crippling anxiety\" for the past several years. She was sober from drugs and alcohol for about a year and half then relapsed later in the year last year. She did IP rehab treatment at [facility] with for two weeks and then insurance would no longer cover and she started their day treatment program. Her anxiety seemed to improve while inpatient, but she felt she wasn't ready to move to OP. While in treatment a roommate was later found to be using phentanyl and heroin and they " "found lots of drugs in their room and pt was triggered by this. Pt started treatment a different program through [facility] and was staying at sober living. She started hanging out with more friends that used drugs and she stopped showing up to treatment and was kicked out of her sober living. She reported that pt's anxiety and depression seems to be a lot worse and that she has been fortunate to still be alive due to her heavy drug use and previous overdoses. She also noted of finding \"tons\" of pills in her purse, with some of them being street drugs and prescribed ones. Pt's mother is a pharmacy tech and reported that some of the street drugs are likely laced with fentanyl.     What do you think the patient needs:      Has patient made comments about wanting to kill themselves/others: yes    Additional collateral information:  She reports that she and her  are both very concerned of pt's safety and feels afraid that pt will die if discharged without treatment for her mental health.     Risk Assessment  [place] Suicide Severity Rating Scale Full Clinical Version:  Suicidal Ideation  Q6 Suicide Behavior (Lifetime): yes          [place] Suicide Severity Rating Scale Recent:   Suicidal Ideation (Recent)  Q1 Wished to be Dead (Past Month): yes (pt denied-mother reports pt opened up to both parents and reported of having SI intent when overdosing on the pills yesterday)  Q2 Suicidal Thoughts (Past Month): yes (pt denied having SI intent during interview. Mother reports pt opened up to both parents and reported of having SI intent when overdosing on the pills yesterday/yesterday evening)  Q3 Suicidal Thought Method: yes (pt denied having SI intent during interview. Mother reports pt opened up to both parents and reported of having SI intent when overdosing on the pills yesterday/yesterday evening)  Q4 Suicidal Intent without Specific Plan: yes (pt denied having SI intent during interview. Mother reports pt " opened up to both parents and reported of having SI intent when overdosing on the pills yesterday/yesterday evening)  Q5 Suicide Intent with Specific Plan: yes (pt denied having SI intent during interview. Mother reports pt opened up to both parents and reported of having SI intent when overdosing on the pills yesterday/yesterday evening)  If yes to Q6, within past 3 months?: yes  Level of Risk per Screen: high risk  Intensity of Ideation (Recent)  Description of Most Severe Ideation (Past 1 Month): unable to clearly assess due to pt's denial of SI-minimizing symptoms that do not align with what she told parents with confirming of having SI intent when overdosing on pills  Frequency (Past 1 Month):  (n/a)  Duration (Past 1 Month):  (n/a)  Controllability (Past 1 Month):  (n/a)  Deterrents (Past 1 Month):  (unable to clearly assess due to pt's denial of SI-minimizing symptoms that do not align with what she told parents with confirming of having SI intent when overdosing on pills)  Reasons for Ideation (Past 1 Month):  (n/a)  Suicidal Behavior (Recent)  Actual Attempt (Past 3 Months): Yes (pt denied SI intent with overdose but told her parents that she was suicidal and had SI intent when overdosing on Xanax yesterday/yesterday evening)  Total Number of Actual Attempts (Past 3 Months): 1  Actual Attempt Description (Past 3 Months): pt denied SI intent with overdose but told her parents that she was suicidal and had SI intent when overdosing on Xanax yesterday/yesterday evening  Has subject engaged in non-suicidal self-injurious behavior? (Past 3 Months): No  Interrupted Attempts (Past 3 Months): No  Aborted or Self-Interrupted Attempt (Past 3 Months): No  Preparatory Acts or Behavior (Past 3 Months): Yes  Total Number of Preparatory Acts (Past 3 Months): 1  Preparatory Acts or Behavior Description (Past 3 Months): collecting pills    Environmental or Psychosocial Events: ongoing abuse of substances, excessive debt,  poor finances  Protective Factors: Protective Factors: strong bond to family unit, community support, or employment, lives in a responsibly safe and stable environment, help seeking    Does the patient have thoughts of harming others? Feels Like Hurting Others: no  Previous Attempt to Hurt Others: no  Is the patient engaging in sexually inappropriate behavior?: no  Does Patient have a known history of aggressive behavior: No    Is the patient engaging in sexually inappropriate behavior?  no        Mental Status Exam   Affect: Flat  Appearance: Disheveled  Attention Span/Concentration: Other (please comment) (variable-nodded off at times)  Eye Contact: Variable    Fund of Knowledge: Other (please comment) (variable, nodded off at times, other times constrictive)   Language /Speech Content: Fluent  Language /Speech Volume: Soft  Language /Speech Rate/Productions: Minimally Responsive  Recent Memory: Poor  Remote Memory: Variable  Mood: Depressed  Orientation to Person: Yes   Orientation to Place: Yes  Orientation to Time of Day: Yes  Orientation to Date: Yes     Situation (Do they understand why they are here?): Yes  Psychomotor Behavior: Normal  Thought Content: Clear  Thought Form: Other (please comment) (unable to clearly assess due to pt's state)        Medication  Psychotropic medications:   Medication Orders - Psychiatric (From admission, onward)      Start     Dose/Rate Route Frequency Ordered Stop    03/15/25 1230  OLANZapine zydis (zyPREXA) ODT tab 5 mg         5 mg Oral ONCE 03/15/25 1229      03/15/25 1215  venlafaxine (EFFEXOR XR) 24 hr capsule 150 mg         150 mg Oral DAILY WITH BREAKFAST 03/15/25 1214      03/15/25 1028  nicotine (NICORETTE) gum 2 mg         2 mg Buccal EVERY 1 HOUR PRN 03/15/25 1028               Current Care Team  Patient Care Team:  [facility] (Inactive) as PCP - General  Dacia Pizano MD as MD (Facial Plastic and Reconstructive Surgery)  Sonido Gonzalez,  MD as Referring Physician (Otolaryngology)  Arpita Edouard, JORGE LUIS as Assigned PCP    Diagnosis  Patient Active Problem List   Diagnosis Code    IUD (intrauterine device) in place Z97.5    Depression F32.A    MARIVEL (generalized anxiety disorder) F41.1    Polysubstance abuse (H) F19.10    Moderate major depression (H) F32.9    Nasal obstruction J34.89    Closed fracture of nasal bone, initial encounter S02.2XXA    Deviated nasal septum J34.2    Hypertrophy of both inferior nasal turbinates J34.3    History of fracture of nasal bone Z87.81    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    Severe episode of recurrent major depressive disorder, without psychotic features (H) F33.2    Generalized anxiety disorder F41.1    Intentional benzodiazepine overdose (H) T42.4X2A       Primary Problem This Admission  Active Hospital Problems    *Severe episode of recurrent major depressive disorder, without psychotic features (H)      Generalized anxiety disorder      Intentional benzodiazepine overdose (H)        Clinical Summary and Substantiation of Recommendations   Clinical Substantiation:  Pt is recommended to admitted to [facility] for safety and stabilization. Pt arrived at the ED late last night after overdosing on 20 (possibly more) Xanax and other pills. Pt denied having SI intent during the interview, but told her parents that she was suicidal and had SI intent when taking the pills. Pt has a hx of polysubstance abuse and was recently in treatment for benzodiazepine abuse after she was sober for a year and half and relapsed late last year. Pt has a hx of previous overdose attempts and her parents are very concerned of her mental health and safety. Pt has a hx of depression and anxiety and her symptoms have increased substantially. Pt had recent changes to her medications within the past month. Pt provided minimal constricted information during the interview, but reported that her depression and anxiety has been very high lately. Lower  levels of care have been unsuccessful in managing symptoms. Therapeutic intervention in the ER has provided minimal progress with pt opening up to provide more insight and an accurate potrayal of her symptoms, but she did open up to her parents and expressed having SI intent when overdosing. Pt has limited protective factors and several risk factors for suicide. For this reason, a secure setting with 24 hour supervision is the most appropriate and least restrictive option available for the pt. While pt remains in the the ED, Extended Care will follow. Therapeutic work should continue, and disposition can re-evaluated if pt s level of immediate risk lowers and they can meaningfully engage in safety planning.    Goals for crisis stabilization:  IP MH treatment is currently recommended to address pt's safety and psychiatry to further evaluate meds. Additional goal is for pt to be further open about MH symptoms and provide more accurate information in regards to overdose and intent. Once pt is determined to be safe and stable for discharge, pt will need to be set up with appropriate recommended ongoing follow up treatment.    Next steps for Care Team:   contacted [facility] and pt was placed on waitlist for IP MH. Pt prefers treatment to be in [facility]. Mother reports she is willing to provide transportation from any site after getting IP MH treatment    Treatment Objectives Addressed:  rapport building, identifying an appropriate aftercare plan, assessing safety, identifying additional supports    Therapeutic Interventions:  Engaged in activity scheduling and behavioral activation, looking at and reviewing the prior week's goals, problem solving any barriers and acknowledging successes, as well as setting new goals., Reviewed healthy living that supports positive mental health, including looking at sleep hygiene, regular movement, nutrition, and regular socialization., Provided positive reinforcement for  progress towards goals, gains in knowledge, and application of skills previously taught., Explored motivation for living/getting treatment., Explored barriers to getting treatment., Explored motivation for behavioral change.    Has a specific means been identified for suicidal/homicide actions: Yes    If yes, describe:  pt denied having SI intent, but mother reported she told her that she was suicidal and had SI intent when overdosing yesterday    Explain action steps toward mitigation:  mitigation steps are still needed-further assessed pt's safety risk    Document completion of mitigation actions:  more steps needed-obtained collateral from pt's mother and did safety risk assessment    The follow up action still needed prior to discharge:  further engagement with safety aftercare planning and plan for removing access to pills    Patient coping skills attempted to reduce the crisis:  help seeking    Disposition  Recommended referrals: Individual Therapy, Medication Management, Programmatic Care, [facility] Comprehensive Assessment        Reviewed case and recommendations with attending provider. Attending Name: Debbie Rodriguez DO       Attending concurs with disposition: yes       Patient and/or validated legal guardian concurs with disposition:   yes       Final disposition:  inpatient mental health         Imminent risk of harm: Suicidal Behavior  Severe psychiatric, behavioral or other comorbid conditions are appropriate for management at inpatient mental health as indicated by at least one of the following: Psychiatric Symptoms, Comorbid substance use disorder, Impaired impulse control, judgement, or insight  Severe dysfunction in daily living is present as indicated by at least one of the following: Extreme deterioration in social interactions, Other evidence of severe dysfunction  Situation and expectations are appropriate for inpatient care: Voluntary treatment at lower level of care is not feasible, Patient  management/treatment at lower level of care is not feasible or is inappropriate, Biopsychosocial stresses potentially contributing to clinical presentation (co morbidities) have been assessed and are absent or manageable at proposed level of care  Inpatient mental health services are necessary to meet patient needs and at least one of the following: Specific condition related to admission diagnosis is present and judged likely to further improve at proposed level of care, Specific condition related to admission diagnosis is present and judged likely to deteriorate in absence of treatment at proposed level of care, Patient is receiving continuing care (eg, transition of care from less intensive level of care)      Legal status: Voluntary/Patient has signed consent for treatment, Other                                                                                                       meets holdable criteria-currently Voluntary status                         Reviewed court records: yes       Assessment Details   Total duration spent with the patient: 21 min     CPT code(s) utilized: 63017 - Psychotherapy (with patient) - 30 (16-37*) min    JAIDA Tijerina, Psychotherapist  [facility] - Triage & Transition Services  Callback: [phone]

## 2025-03-15 NOTE — ED NOTES
"Writer to pt bedside, pt ripped out IV and all monitoring equipment. Writer assisted pt up SBA to the restroom. Pt voided and walked back to bed. Pt stating \"Im hungry, give me more blankets\" \"where is my vape\" writer attempting to untangle cords and monitoring devices pt being non cooperative with purposeful movements. Pt states \"what time is it\" Writer states \"0430 am\" Pt states \"can I have my sleeping meds\" Writer states \"No you are here due to overdosing on xanax and you can't have your sleeping meds right now you will sleep all day\" pt laughed. Writer provided pt with a sandwich and apple juice. Pt sitting up in bed eating.   "

## 2025-03-15 NOTE — PLAN OF CARE
Problem: Suicide Risk  Goal: Absence of Self-Harm  Outcome: Progressing  /66 (BP Location: Left arm, Patient Position: Sitting, Cuff Size: Adult Regular)   Pulse 66   Temp 97.6  F (36.4  C) (Temporal)   Resp 16   SpO2 98%     Pt admitted to [facility] voluntarily from [facility] ED for OD  Per report pt overdose with benzo with alcohol and was unconscious and CPR performed for 2 min before she became responsive.   Upon arrival to the unit, pt was lethargic and unsteady transferred her to W/C and did the search in her room and put her on SIO to assess her gait.  Pt was cooperative with the search, vitals, and  but unable to do the admission interview because she was sleepy and unwilling to talk.  Pt  needs stand by assist and  for high fall risk,  paged the provider and got SIO order for tonight. Pt slept until 2030 and woke her up for food and assessment. Pt ate about 75% of her dinner and went back to sleep.  She continues to be drowsier and  lethargic held bedtime gabapentin 800mg  per provider.

## 2025-03-15 NOTE — PLAN OF CARE
03/15/25 1752   Patient Belongings   Did you bring any home meds/supplements to the hospital?  No   Patient Belongings remains with patient;locker   Patient Belongings Remaining with Patient clothing   Patient Belongings Put in Hospital Secure Location (Security or Locker, etc.) cell phone/electronics;clothing;shoes   Belongings Search Yes   Clothing Search Yes   Second Staff Barbara OLVERA   Comment No Wallet.     Goal Outcome Evaluation:    In Locker 3/15/25:  Black Steve Mouse Sweater  Black Leggings  Grey Socks  Grey Slippers  Vape  Iphone    ..A               Admission:  I am responsible for any personal items that are not sent to the safe or pharmacy.  [facility] is not responsible for loss, theft or damage of any property in my possession.    Signature:  _________________________________ Date: _______  Time: _____                                              Staff Signature:  ____________________________ Date: ________  Time: _____      2nd Staff person, if patient is unable/unwilling to sign:    Signature: ________________________________ Date: ________  Time: _____     Discharge:  [facility] has returned all of my personal belongings:    Signature: _________________________________ Date: ________  Time: _____                                          Staff Signature:  ____________________________ Date: ________  Time: _____

## 2025-03-15 NOTE — ED NOTES
attempted to meet with pt for [facility] assessment. Pt has difficulty keeping her eyes open and formulating coherent sentences. Informed MD to reach back out to [facility] when pt is more alert.

## 2025-03-15 NOTE — ED NOTES
Pt attempting to talk. Speech is slurred. Pt attempting to look for her cell phone and vape. Writer updated pt that we put her belongings including cell phone and vape in a bag and gave them to her parents. Pt asking for food. MD updated gave pt ice chips. Will continue to monitors and reassess.

## 2025-03-16 PROCEDURE — 99223 1ST HOSP IP/OBS HIGH 75: CPT | Mod: AI | Performed by: REGISTERED NURSE

## 2025-03-16 PROCEDURE — 128N000002 HC R&B CD/MH ADOLESCENT

## 2025-03-16 PROCEDURE — 250N000013 HC RX MED GY IP 250 OP 250 PS 637: Performed by: STUDENT IN AN ORGANIZED HEALTH CARE EDUCATION/TRAINING PROGRAM

## 2025-03-16 PROCEDURE — 250N000013 HC RX MED GY IP 250 OP 250 PS 637: Performed by: REGISTERED NURSE

## 2025-03-16 RX ORDER — HYDROXYZINE HYDROCHLORIDE 25 MG/1
25 TABLET, FILM COATED ORAL 4 TIMES DAILY PRN
COMMUNITY

## 2025-03-16 RX ORDER — TRAZODONE HYDROCHLORIDE 50 MG/1
50 TABLET ORAL
Status: DISCONTINUED | OUTPATIENT
Start: 2025-03-16 | End: 2025-03-17

## 2025-03-16 RX ORDER — RAMELTEON 8 MG/1
8 TABLET ORAL AT BEDTIME
COMMUNITY

## 2025-03-16 RX ORDER — LISDEXAMFETAMINE DIMESYLATE 50 MG/1
50 CAPSULE ORAL DAILY PRN
Status: ON HOLD | COMMUNITY
End: 2025-03-20

## 2025-03-16 RX ORDER — CLONIDINE HYDROCHLORIDE 0.1 MG/1
0.1 TABLET ORAL DAILY PRN
Status: ON HOLD | COMMUNITY
End: 2025-03-20

## 2025-03-16 RX ORDER — CLONAZEPAM 1 MG/1
1 TABLET ORAL 2 TIMES DAILY PRN
Status: ON HOLD | COMMUNITY
End: 2025-03-20

## 2025-03-16 RX ORDER — NICOTINE 21 MG/24HR
1 PATCH, TRANSDERMAL 24 HOURS TRANSDERMAL EVERY 24 HOURS
COMMUNITY

## 2025-03-16 RX ORDER — NICOTINE 21 MG/24HR
1 PATCH, TRANSDERMAL 24 HOURS TRANSDERMAL DAILY
Status: DISCONTINUED | OUTPATIENT
Start: 2025-03-16 | End: 2025-03-18

## 2025-03-16 RX ORDER — GABAPENTIN 100 MG/1
200 CAPSULE ORAL 3 TIMES DAILY
Status: DISCONTINUED | OUTPATIENT
Start: 2025-03-16 | End: 2025-03-17

## 2025-03-16 RX ADMIN — TRAZODONE HYDROCHLORIDE 50 MG: 50 TABLET ORAL at 22:46

## 2025-03-16 RX ADMIN — NICOTINE 1 PATCH: 14 PATCH, EXTENDED RELEASE TRANSDERMAL at 14:42

## 2025-03-16 RX ADMIN — Medication 3 MG: at 20:02

## 2025-03-16 RX ADMIN — GABAPENTIN 200 MG: 100 CAPSULE ORAL at 14:01

## 2025-03-16 RX ADMIN — GABAPENTIN 200 MG: 100 CAPSULE ORAL at 19:56

## 2025-03-16 RX ADMIN — NICOTINE POLACRILEX 2 MG: 2 GUM, CHEWING BUCCAL at 14:42

## 2025-03-16 ASSESSMENT — ACTIVITIES OF DAILY LIVING (ADL)
ADLS_ACUITY_SCORE: 18

## 2025-03-16 NOTE — PHARMACY-ADMISSION MEDICATION HISTORY
Admission Medication History Completed by Pharmacy    See [facility] Admission Navigator for allergy information, preferred outpatient pharmacy, prior to admission medications and immunization status.     Medication history sources:  Patient; Surescripts     Pertinent changes made to PTA medication list:  Added: All PTA medications added to list (confirmed with patient)   Deleted:   - Gabapentin 300 mg capsules   - Ondansetron 4 mg tablets   - Doxycycline 100 mg capsules   - Valacyclovir 1000 mg tablets   Changed: N/A    Additional medication history information:   - Per patient, she her vyvanse is prescribed as 25 mg daily as needed... Vyvanse does not come in 25 mg capsule strength and she has only ever filled 50 mg capsules.   - Patient denies taking any additional Rx/OTC medications other than the ones listed below.    Prior to Admission medications    Medication Sig Last Dose Taking? Auth Provider Long Term End Date   clonazePAM (KLONOPIN) 1 MG tablet Take 1 mg by mouth 2 times daily as needed for anxiety. Past Week Yes Unknown, Entered By History Yes    cloNIDine (CATAPRES) 0.1 MG tablet Take 0.1 mg by mouth daily as needed. Past Week Yes Unknown, Entered By History Yes    gabapentin (NEURONTIN) 800 MG tablet Take 1 tablet by mouth 3 times daily. Past Week Yes Reported, Patient Yes    hydrOXYzine HCl (ATARAX) 25 MG tablet Take 25 mg by mouth 4 times daily as needed for anxiety. Past Week Yes Unknown, Entered By History     lisdexamfetamine (VYVANSE) 50 MG capsule Take 50 mg by mouth daily as needed. Past Week Yes Unknown, Entered By History     nicotine (NICODERM CQ) 21 MG/24HR 24 hr patch Place 1 patch onto the skin every 24 hours. Past Week Yes Unknown, Entered By History     ramelteon (ROZEREM) 8 MG tablet Take 8 mg by mouth at bedtime. Past Week Yes Unknown, Entered By History     levonorgestrel (MIRENA) 20 MCG/24HR IUD 1 each by Intrauterine route once   Reported, Patient Yes    traZODone (DESYREL) 150 MG  tablet Take 1 tablet by mouth at bedtime. Past Week Yes Reported, Patient Yes    venlafaxine (EFFEXOR-XR) 150 MG 24 hr capsule TAKE 1 CAPSULE BY MOUTH EVERY DAY Past Week Yes Reported, Patient Yes           Date completed: 03/16/25    Medication history completed by:   Elizabeth Rueda, PharmD  *[id number]

## 2025-03-16 NOTE — H&P
"History and Physical    Lamar Rosas MRN# [id number]   Age: 24 year old YOB: 2000     Date of Admission:  3/15/2025           Assessment:   This patient is a 24 year old  female with a significant past psychiatric history of  depression, anxiety, and substance use  who presented to ED after recent overdose. She reports she was at her sober home and did not have anyone to hang out with and then used heroin. She currently is not the best historian and is very fatigued. She has SIO 1:1 for safety for falls precautions. She has been mostly sleeping in bed since arriving to unit. She is still disoriented and confused, not giving much information when asked questions. She has been eating. She endorses recent feelings of depression and endorses anxiety. She denies cravings today and reports goal of wanting to get sober again and \"be happy\". Provider discussed medications and reducing current gabapentin dose due to her lethargy.          Diagnoses:   Benzodiazepine overdose of undetermined intent  Major depression recurrent moderate without psychosis symptoms  Generalized anxiety disorder  Polysubstance use disorder (h)    Clinically Significant Risk Factors Present on Admission                                 # Financial/Environmental Concerns:              Recommendations:   Admit to Unit: 6A  Attending Physician: Larry Pineda MD under direct care of SHADI Akhtar CNP    Patient is: voluntary    Routine lab studies have been requested and reviewed. Protein total low 6.0, UDS positive for amphetamines and benzodiazepines.     Monitor for target symptoms.     Provide a safe environment and therapeutic milieu.     Medications:  Continue venlafaxine 150 mg daily  Continue gabapentin at lower dose 200 mg TID for anxiety  Prns: hydroxyzine for anxiety, olanzapine for agitation    Attestation:  Patient has been seen and evaluated by me,  SHADI Akhtar CNP  The patient was counseled on  nature of " "illness and treatment plan/options  Care was coordinated with  unit RN  Total amount of time: >75 minutes  Coordination of care/counseling: 10 minutes         Chief Complaint:   History is obtained from the patient and electronic health record  \"Relapsed\"         History of Present Illness:     Per ED note on 3/14/25:  Lamar Rosas is a 24 year old female, past medical history is significant for depression, polysubstance abuse, generalized anxiety disorder, depression, emergency department concerns of benzodiazepine overdose; clonazepam (some of her own recently prescribed a week ago and some from the \"dark web\") and Xanax(off the dark web as well as mom states she has never had Xanax prescribed to her).  Mom states that the patient was recently at [facility] for benzodiazepine abuse about a week ago.  Mom states that she is taken up with some unsavory character who likely facilitated her access to additional benzodiazepines without prescription.  Ingestion was approximately an hour or 2 ago they are not really sure.  They do not know how much she took.  They do not think that she has been drinking alcohol as well but I note upon reviewing the EHR that she has a history for alcohol intoxication acutely in 11/28/2024, 11/10/2024 within our system.  When I asked the patient why she took Xanax and clonazepam she shook her head that this was not a suicide attempt.  Mom expressed considerable frustration that they have been unable to get her the help that they need with mental illness.             Per DEC note:  Referral Data and Chief Complaint  Lamar Rosas presents to the ED with family/friends (pt's mom brought pt to ED). Patient is presenting to the ED for the following concerns: Other (see comment), Anxiety, Depression, Substance use (drug benzodiazepine overdose). Factors that make the mental health crisis life threatening or complex are: Pt arrived at the ED after overdosing and ingesting 20 plus xanax and other " "possible \"street drug\" pills yesterday and later on again yesterday evening. Pt denied having SI intent when interviewed. Collateral information was provided from pt's mother who reported that pt admitted to being suicidal and having suicidal intent when taking the pills. Pt's mother is a pharmacy tech and expressed further concerns after finding several pills in pt's purse that consisted of a combination of prescribed and street pills, some that she felt could likely be laced with fentanyl.  Pt has a hx of anxiety, depression and polysubstance abuse was recently attending treatment for benzodiazepine abuse. Pt provided minimal constricted information and her speech was difficult to understand during the interview, but was able to report of having increased depression and anxiety symptoms. Pt reported that she felt she was discharged from treatment too early. Pt has a hx of previous overdose attempts and hospitalizations..     History of the Crisis   Pt has a hx of being diagnosed with MDD, MARIVEL and polysubstance abuse. Pt was sober for a year and half prior to relapsing late last year. Pt has been to two treatment programs since then and continues to struggle with worsening depression and anxiety. Pt has a hx of multiple overdose attempts and was hospitalized in [place] two years ago due to overdose. Pt's mother provided further collateral and reported that she feels pt's symptoms are at their worst and is concerned that she wont be alive and will go back to using drugs-overdosing if discharged without mental health treatment.       Psychiatric Review of Systems:        Depression: endorses low mood, isolation     Anxiety: Endorses racing thoughts, worry     PTSD: Denies      ED: Denies     Psychosis: Denies paranoia, hallucinations     Shayla: Denies     SI: denies current plan for suicide, endorses passive SI, not thinking she's worth living.      Mental status exam:  Appearance:  fatigued, awakes to voice, dressed in " hospital scrubs  Attitude:  somewhat cooperative  Eye Contact:  fair  Mood:  depressed  Affect:  intensity is blunted  Speech:  clear, coherent  Psychomotor Behavior:  no evidence of tardive dyskinesia, dystonia, or tics   Thought Process:  gives minimal information, ALISON  Associations:  no loose associations  Thought Content:  passive suicidal ideation present, denies hallucinations or paranoia  Insight:  fair to poor  Judgment:  fair to poor  Oriented to:  disoriented   Attention Span and Concentration:  fair  Recent and Remote Memory:  poor to fair                Medical Review of Systems:   The 10 point Review of Systems is negative other than noted in the HPI           Psychiatric History:   Patient was recently at [facility] for CD treatment.     Multiple ED visits for polysubstance use, major depression, SI    Denies history of committment           Substance Use History:   Heroin, fentanyl, alcohol recent use.           Past Medical History:   No past medical history on file.     Primary Care Physician: John Kelley (Inactive)  No History of: hepatitis, HIV, and seizures         Past Surgical History:   No past surgical history on file.         Allergies:      Allergies   Allergen Reactions    Seasonal Allergies           Medications:   I have reviewed this patient's current medications     Current Facility-Administered Medications:     acetaminophen (TYLENOL) tablet 650 mg, 650 mg, Oral, Q4H PRN, Tawanda Maldonado APRN CNP    alum & mag hydroxide-simethicone (MAALOX) suspension 30 mL, 30 mL, Oral, Q4H PRN, Tawanda Maldonado APRN CNP    gabapentin (NEURONTIN) capsule 200 mg, 200 mg, Oral, TID, Tawanda Maldonado APRN CNP    hydrOXYzine HCl (ATARAX) tablet 25 mg, 25 mg, Oral, Q4H PRN, Tawanda Maldonado APRN CNP    melatonin tablet 3 mg, 3 mg, Oral, At Bedtime PRN, Tawanda Maldonado APRN CNP    nicotine (NICORETTE) gum 2 mg, 2 mg, Buccal, Q1H PRN, Tawanda Maldonado APRN CNP    OLANZapine (zyPREXA) tablet  "10 mg, 10 mg, Oral, TID PRN **OR** OLANZapine (zyPREXA) injection 10 mg, 10 mg, Intramuscular, TID PRN, Tawanda Maldonado APRN CNP    senna-docusate (SENOKOT-S/PERICOLACE) 8.6-50 MG per tablet 1 tablet, 1 tablet, Oral, BID PRN, Tawanda Maldonado APRN CNP    venlafaxine (EFFEXOR XR) 24 hr capsule 150 mg, 150 mg, Oral, Daily with breakfast, Tawanda Maldonado APRN CNP         Social History:     Early history: Grew up in [place] with both parents, two sisters.    Educational history: Graduated HS   Marital history: None   Children: None   Current living situation: Sober living    history: None           Family History:   Angel history          Labs:   No results found for: \"NTBNPI\", \"NTBNP\"  Lab Results   Component Value Date    WBC 6.6 03/14/2025    HGB 13.9 03/14/2025    HCT 38.4 03/14/2025    MCV 87 03/14/2025     03/14/2025     Lab Results   Component Value Date    GLC 75 03/14/2025     Lab Results   Component Value Date    HGB 13.9 03/14/2025     Lab Results   Component Value Date    AST 15 03/15/2025    ALT 13 03/15/2025    ALKPHOS 74 03/15/2025    BILITOTAL 0.5 03/15/2025     Lab Results   Component Value Date     03/14/2025     Lab Results   Component Value Date    TSH 0.87 08/11/2021     Lab Results   Component Value Date    WBC 6.6 03/14/2025       BP 92/59 (BP Location: Right arm, Patient Position: Supine, Cuff Size: Adult Regular)   Pulse 59   Temp 98.5  F (36.9  C) (Temporal)   Resp 16   SpO2 98%   Weight is 0 lbs 0 oz  There is no height or weight on file to calculate BMI.      Physical Exam   BP: 116/78  Pulse: 77  Temp: 98.5  F (36.9  C)  Resp: 27  SpO2: 97 %        Physical Exam  Vitals and nursing note reviewed.   Constitutional:       General: She is not in acute distress.     Appearance: Normal appearance. She is not ill-appearing.      Comments: Cachectic appearing, drowsy, arousable to verbal stimuli.   HENT:      Head: Normocephalic and atraumatic.      Right Ear: Tympanic " membrane, ear canal and external ear normal.      Left Ear: Tympanic membrane, ear canal and external ear normal.      Nose: Nose normal.      Mouth/Throat:      Mouth: Mucous membranes are dry.      Pharynx: Oropharynx is clear.      Comments: Gag reflex intact at the time of initial evaluation.  Eyes:      Extraocular Movements: Extraocular movements intact.      Conjunctiva/sclera: Conjunctivae normal.      Pupils: Pupils are equal, round, and reactive to light.   Cardiovascular:      Rate and Rhythm: Normal rate and regular rhythm.      Pulses: Normal pulses.      Heart sounds: Normal heart sounds.   Pulmonary:      Effort: Pulmonary effort is normal.      Breath sounds: Normal breath sounds.   Abdominal:      General: Bowel sounds are normal.      Palpations: Abdomen is soft.   Musculoskeletal:         General: Normal range of motion.      Cervical back: Normal range of motion and neck supple.   Skin:     General: Skin is warm and dry.      Capillary Refill: Capillary refill takes less than 2 seconds.   Neurological:      Mental Status: She is alert.

## 2025-03-16 NOTE — PLAN OF CARE
Problem: Suicide Risk  Goal: Absence of Self-Harm  3/16/2025 0708 by Lamar Gonzalez, RN  Outcome: Progressing  3/16/2025 0621 by Lamar Gonzalez, RN  Outcome: Progressing   Goal Outcome Evaluation:    Vital signs checked this morning. BP 92/59, P-59. R-16, T-98.5, 02 Sats 97% all taken lying down. Patient has been drinking and encouraged to drink more. Sleeps on and on.

## 2025-03-16 NOTE — PLAN OF CARE
" INITIAL PSYCHOSOCIAL ASSESSMENT AND NOTE    Information for assessment was obtained from:       [x]Patient     []Parent     []Community provider    [x]Hospital records   []Other     []Guardian  *Pt was agreeable to meeting with Writer, but was not able to provide much information at the time of the interview. Writer obtained most information from EHR.      Presenting Problem:  Patient is a 24 year old female who uses she/her. Patient was admitted to [facility] on 3/15/2025 Station [facility] voluntarily.    Presenting issues and presentation for admit: Per H&P by SHADI Dejesus, CNP on 3/16/25:  This patient is a 24 year old  female with a significant past psychiatric history of  depression, anxiety, and substance use  who presented to ED after recent overdose. She reports she was at her sober home and did not have anyone to hang out with and then used heroin. She currently is not the best historian and is very fatigued. She has SIO 1:1 for safety for falls precautions. She has been mostly sleeping in bed since arriving to unit. She is still disoriented and confused, not giving much information when asked questions. She has been eating. She endorses recent feelings of depression and endorses anxiety. She denies cravings today and reports goal of wanting to get sober again and \"be happy\". Provider discussed medications and reducing current gabapentin dose due to her lethargy.       The following areas have been assessed:    History of Mental Health and Chemical Dependency:  Mental Health History:  Patient has a historical diagnosis of polysubstance use, MARIVEL-anxiety and MDD-depression.   The patient has a history of suicide attempts.   Patient  has not a history of engaged in non-suicidal self-injury .     Previous psychiatric hospitalizations and treatments (including outpatient, residential, and inpatient care:  Substance use history: Heroin, fentanyl, benzodiazepines and " alcohol use.    Substance Use History  History of substance abuse treatment, most recently at [facility] for benzodiazepine abuse about a week ago. Per EHR Pt has a history of alcohol intoxication on 11/28/24 and 11/10/24 within the [facility] system. Pt was hospitalized in [place] for overdose attempt about two years ago.       Patient's current relationship status is   single.   Patient reported having zero child(priti).       Family Description (Constellation, significant information and events, Family Psychiatric History):   Pt was raised in [place] with both parents and has two sisters.     Significant Medical issues, Life events or Trauma history:   Denies trauma history.       Living Situation:  Patient's current living/housing situation is sober living (but has been kicked out). At the time of the interview, Pt stated they would like to return to sober living facility, if possible. The hope of family is to get Pt to a residential treatment program.        Educational Background:    Patient's highest education level was high school graduate. Patient reports they are  able to understand written materials.     Occupational and Financial Status:     Patient is currently  unemployed .  Patient reports  income is obtained through  unknown, likely family .  Patient does identify finances as a current stressor. They are insured under [employer] and [employer] [place]. Restrictions (No/Yes): unknown    Occupational History: unknown    Legal Concerns (current or past history):       Current Concerns: denies     Past History: denies       Legal Status:  Voluntary        Commitment History: denies        Service History: not applicable     Ethnic/Cultural/Spiritual considerations:   The patient describes their cultural background as White/, heterosexual, cisgender and female.  Contextual influences on patient's health include severity of symptoms, lack of social support, safety concerns, level of  functioning, and medication adherence concerns.   Patient identified their preferred language to be English. Patient reported they do not need the assistance of an .  Spiritual considerations include: unknown     Social Functioning (organizations, interests, support system):   In their free time, patient reports they like to not assessed .      Patient identified parents and siblings as part of their support system.  Patient identified the quality of these relationships as good.       Current Treatment Providers are:  No providers per Pt.     Other contact information (family, friends, SO) and CHIDI status: check paper chart for most up to date information       GOALS FOR HOSPITALIZATION:  What do patient want to accomplish during this hospitalization to make things better for the patient.?   Patient priorities:  The patient reported that what is most important to them is get better. They identified getting better as a goal of this hospitalization.    Social Service Assessment/Plan:  Patient view:   Patient reports it is important for the care team to know not assessed.  Upon discharge, they anticipate needing-possible GIANA assessment if residential treatment is the plan- set up for them.      Strengths and Assets:  The patient uses these coping skills to help with stress and hard times: not assessed.          Patient will have psychiatric assessment and medication management by the psychiatrist. Medications will be reviewed and adjusted per DO/MD/APRN CNP as indicated. The treatment team will continue to assess and stabilize the patient's mental health symptoms with the use of medications and therapeutic programming. Hospital staff will provide a safe environment and a therapeutic milieu. Staff will continue to assess patient as needed. Patient will participate in unit groups and activities. Patient will receive individual and group support on the unit.      CTC will do individual inpatient treatment  planning and after care planning. CTC will discuss options for increasing community supports with the patient. CTC will coordinate with outpatient providers and will place referrals to ensure appropriate follow up care is in place.

## 2025-03-16 NOTE — PROGRESS NOTES
Patient was eating and seemed disoriented.  She kept moving her fork around on the plate and closing her eyes.  Patient responded when writer asked questions.  Patient knew she was in mental health unit.

## 2025-03-16 NOTE — PLAN OF CARE
Problem: Suicide Risk  Goal: Absence of Self-Harm  Outcome: Progressing   Goal Outcome Evaluation:    Patient slept for 5.75 hours last night. Breathing even and unlabored. No indication of pain or discomfort. Safety checks done per routine. No occurrences. Patient was seen walking the hallway to the launch to get snacks with SIO staff. Patient was offered snacks twice this shift. No behavior or safety concerns at this time. Will continue to monitor.

## 2025-03-16 NOTE — PLAN OF CARE
Problem: Suicide Risk  Goal: Absence of Self-Harm  Intervention: Promote Psychosocial Wellbeing  Recent Flowsheet Documentation  Taken 3/16/2025 1300 by Purvi Murray RN  Family/Support System Care: self-care encouraged   Goal Outcome Evaluation:     Plan of Care Reviewed With: patient     Continues on SIO for safety. Lethargic on days. Was also noted to be confused and disoriented. Reported feeling cold, warm blankets were offered. Needed hands on assist with cares and feeding. Gait belt was used for transfers due to high risk of falls. Vital signs monitored through out the shift. BP at the beginning of the shift was 75/48 and Pulse was 77. Fluids were pushed through out the shift. Ate 50% breakfast and lunch. BP at 1248 was 114/71 and pulse was 90. Was seen and provider. Reported 10/10 anxiety and depression. Declined PRN Hydroxyzine but took scheduled Gabapentin 200 mg. Effexor was held at 0800 due to being drowsy and lethargic. Denied SI, SIB, and HI. Contracted for safety. Will continue to monitor.     /71 (BP Location: Right arm, Patient Position: Sitting, Cuff Size: Adult Regular)   Pulse 90   Temp 98.2  F (36.8  C) (Temporal)   Resp 16   SpO2 97%

## 2025-03-17 PROCEDURE — 99232 SBSQ HOSP IP/OBS MODERATE 35: CPT | Performed by: REGISTERED NURSE

## 2025-03-17 PROCEDURE — 250N000013 HC RX MED GY IP 250 OP 250 PS 637: Performed by: REGISTERED NURSE

## 2025-03-17 PROCEDURE — 128N000002 HC R&B CD/MH ADOLESCENT

## 2025-03-17 PROCEDURE — 97150 GROUP THERAPEUTIC PROCEDURES: CPT | Mod: GO

## 2025-03-17 PROCEDURE — 90834 PSYTX W PT 45 MINUTES: CPT

## 2025-03-17 RX ORDER — GABAPENTIN 400 MG/1
400 CAPSULE ORAL 3 TIMES DAILY
Status: DISCONTINUED | OUTPATIENT
Start: 2025-03-17 | End: 2025-03-18

## 2025-03-17 RX ORDER — CLONIDINE HYDROCHLORIDE 0.1 MG/1
0.1 TABLET ORAL DAILY PRN
Status: DISCONTINUED | OUTPATIENT
Start: 2025-03-17 | End: 2025-03-18

## 2025-03-17 RX ORDER — RAMELTEON 8 MG/1
8 TABLET ORAL AT BEDTIME
Status: DISCONTINUED | OUTPATIENT
Start: 2025-03-17 | End: 2025-03-20 | Stop reason: HOSPADM

## 2025-03-17 RX ADMIN — TRAZODONE HYDROCHLORIDE 150 MG: 100 TABLET ORAL at 19:37

## 2025-03-17 RX ADMIN — NICOTINE 1 PATCH: 14 PATCH, EXTENDED RELEASE TRANSDERMAL at 07:59

## 2025-03-17 RX ADMIN — VENLAFAXINE HYDROCHLORIDE 150 MG: 150 CAPSULE, EXTENDED RELEASE ORAL at 07:59

## 2025-03-17 RX ADMIN — GABAPENTIN 200 MG: 100 CAPSULE ORAL at 07:59

## 2025-03-17 RX ADMIN — GABAPENTIN 400 MG: 400 CAPSULE ORAL at 13:47

## 2025-03-17 RX ADMIN — GABAPENTIN 400 MG: 400 CAPSULE ORAL at 19:36

## 2025-03-17 RX ADMIN — RAMELTEON 8 MG: 8 TABLET, FILM COATED ORAL at 19:36

## 2025-03-17 ASSESSMENT — ACTIVITIES OF DAILY LIVING (ADL)
ADLS_ACUITY_SCORE: 18
ADLS_ACUITY_SCORE: 20
ADLS_ACUITY_SCORE: 18
ADLS_ACUITY_SCORE: 18
ADLS_ACUITY_SCORE: 20
ADLS_ACUITY_SCORE: 18
ADLS_ACUITY_SCORE: 18
ADLS_ACUITY_SCORE: 20
ADLS_ACUITY_SCORE: 18
ADLS_ACUITY_SCORE: 20
ADLS_ACUITY_SCORE: 18
ADLS_ACUITY_SCORE: 18

## 2025-03-17 NOTE — PLAN OF CARE
"  Problem: Suicide Risk  Goal: Absence of Self-Harm  Outcome: Progressing   Goal Outcome Evaluation:  /68   Pulse 59   Temp 98.4  F (36.9  C) (Oral)   Resp 16   SpO2 97%       Pt continues to be on SIO for fall risk. She was in and out of her room sleeping and making phone calls. Good appetite and fluid intake and ate about 100% of her dinner. She denied having pain, discomfort and medication side effects. Pt was more alert but disoriented to time. Around 1800 pt request to leave, explained to her about the 12hr intent and decided to wait until tomorrow to talk to the provider. She had flat blunted affect and guarded only giving short answers. Pt kept asking to get her regular medications like Klonopin, Rozerem,  higher dose of gabapentin and sleeping medication. Pt denied overdosing using Klonopin. \" I used fentanyl\" pt denied all MHS. Pt also tried to kill herself when she was 21years old using drugs. Voided good and had a BM.                 "

## 2025-03-17 NOTE — PROGRESS NOTES
"[facility],  Psychiatric Progress Note      Interim History:   The patient's care was discussed with the treatment team and chart notes were reviewed.     Today during the interview with the treatment team staff report she slept 6.5 hours overnight. She was up moving and ambulating without need for gait belt or staff support. She is less drowsy and more alert. She has been medication complaint.     Upon interview today:  Patient reports she is missing her mother. She is tearful. She reports she is \"mad\" as she wanted to leave today. She reports she has high anxiety. She is not safe for discharge today as she recently had suspected overdose suicide attempts which she reports was not an attempt. She denies current cravings to use. She denies current thoughts to harm herself or others. She is willing to meet with CD  for treatment options for discharge planning. She lacks insight. She reports she feels safe ambulating on her own today and will trial off SIO.     Provider discussed medications:  She reports she wants her gabapentin dose higher again. Provider discussed her lethargy and inability to stay awake yesterday was concerning and that her other medications will be continued for anxiety (clonidine only) and sleep rozerem and increase trazodone to 150 mg at bedtime. Provider not wiling to continue clonazepam as she is chemically dependent and this will not benefit her.     The 10 point Review of Systems is negative other than noted in the HPI         DIagnoses:     Benzodiazepine overdose of undetermined intent  Major depression recurrent moderate without psychosis symptoms  Generalized anxiety disorder  Polysubstance use disorder (h)    Clinically Significant Risk Factors                                  # Financial/Environmental Concerns:                  Plan:   Legal status: voluntary but hold able as she is not safe for discharge     Routine lab reviewed. " Protein total low 6.0, UDS positive for amphetamines and benzodiazepines.      Monitor for target symptoms.      Provide a safe environment and therapeutic milieu.     Pateint may benefit from groups and unit therapy for coping skills and safety plan.      Medications:  venlafaxine 150 mg daily  gabapentin at lower dose 400 mg TID for anxiety until she is tolerating and able to stay awake during day  Trazodone 150 mg for sleep  Rozerem 8 mg for sleep   Prns: hydroxyzine for anxiety, olanzapine for agitation, clonidine for anxiety use after hydroxyzine    CD assessment ordered    Attestation:  Patient has been seen and evaluated by Tawanda villarreal APRN CNP  The patient was counseled on  nature of illness and treatment plan/options  Care was coordinated with  unit RN  Total amount of time: >35 minutes  Coordination of care/counseling: 10 minutes            Medications:     Current Facility-Administered Medications   Medication Dose Route Frequency Provider Last Rate Last Admin    acetaminophen (TYLENOL) tablet 650 mg  650 mg Oral Q4H PRN Tawanda Maldonado APRN CNP        alum & mag hydroxide-simethicone (MAALOX) suspension 30 mL  30 mL Oral Q4H PRN Tawanda Maldonado APRN CNP        cloNIDine (CATAPRES) tablet 0.1 mg  0.1 mg Oral Daily PRN Tawanda Maldonado APRN CNP        gabapentin (NEURONTIN) capsule 400 mg  400 mg Oral TID Tawanda Maldonado APRN CNP        hydrOXYzine HCl (ATARAX) tablet 25 mg  25 mg Oral Q4H PRN Tawanda Maldonado APRN CNP        nicotine (NICODERM CQ) 14 MG/24HR 24 hr patch 1 patch  1 patch Transdermal Daily Tawanda Maldonado APRN CNP   1 patch at 03/17/25 0759    nicotine (NICORETTE) gum 2 mg  2 mg Buccal Q1H PRN Tawanda Maldonado APRN CNP   2 mg at 03/16/25 1442    OLANZapine (zyPREXA) tablet 10 mg  10 mg Oral TID PRN Tawanda Maldonado APRN CNP        Or    OLANZapine (zyPREXA) injection 10 mg  10 mg Intramuscular TID PRN Tawanda Maldonado APRN CNP        ramelteon (ROZEREM) tablet 8 mg  8 mg Oral  "At Bedtime Tawanda Maldonado APRN CNP        senna-docusate (SENOKOT-S/PERICOLACE) 8.6-50 MG per tablet 1 tablet  1 tablet Oral BID PRN Tawanda Maldonado APRN CNP        traZODone (DESYREL) tablet 150 mg  150 mg Oral At Bedtime Tawanda Maldonado APRN CNP        venlafaxine (EFFEXOR XR) 24 hr capsule 150 mg  150 mg Oral Daily with breakfast Tawanda Maldonado APRN CNP   150 mg at 03/17/25 0759             Allergies:     Allergies   Allergen Reactions    Seasonal Allergies             Psychiatric Examination:   /70 (BP Location: Left arm, Patient Position: Sitting, Cuff Size: Adult Regular)   Pulse 80   Temp 98.1  F (36.7  C) (Oral)   Resp 16   SpO2 99%   Weight is 0 lbs 0 oz  There is no height or weight on file to calculate BMI.    Appearance:  awake, alert, dressed in hospital scrubs, and slightly unkempt  Attitude:  somewhat cooperative  Eye Contact:  fair to poor  Mood:  depressed  Affect:  intensity is blunted  Speech:  mumbling and soft speech  Psychomotor Behavior:  no evidence of tardive dyskinesia, dystonia, or tics and fidgeting  Thought Process:  goal oriented  Associations:  no loose associations  Thought Content:  no evidence of suicidal ideation or homicidal ideation, no auditory hallucinations present, and no visual hallucinations present  Insight:  limited  Judgment:  limited  Oriented to:  place year situation   Attention Span and Concentration:  intact  Recent and Remote Memory:  intact to fair         Labs:   No results found for: \"NTBNPI\", \"NTBNP\"  Lab Results   Component Value Date    WBC 6.6 03/14/2025    HGB 13.9 03/14/2025    HCT 38.4 03/14/2025    MCV 87 03/14/2025     03/14/2025     Lab Results   Component Value Date    GFRESTIMATED 79 03/14/2025    GFRESTBLACK >90 05/14/2020     Lab Results   Component Value Date    GLC 75 03/14/2025     Lab Results   Component Value Date    HGB 13.9 03/14/2025     Lab Results   Component Value Date    AST 15 03/15/2025    ALT 13 03/15/2025    " ALKPHOS 74 03/15/2025    BILITOTAL 0.5 03/15/2025     Lab Results   Component Value Date    TSH 0.87 08/11/2021     Lab Results   Component Value Date    WBC 6.6 03/14/2025

## 2025-03-17 NOTE — PLAN OF CARE
"  Rehab Group    Start time: 1315  End time: 1400  Patient time total: 25 minutes    attended partial group    #6 attended   Group Type: occupational therapy   Group Topic Covered: balanced lifestyle, coping skills, mindfulness, self-care, and self-esteem       Group Session Detail:  Mental Health management: gratitude     Patient Response/Contribution:  cooperative with task, organized, expressed understanding of topic, attentive, and actively engaged       Patient Detail:    Occupation-based group focused on gratitude towards support network. Pt Response: Pt engaged in a group discussion about the benefit of cultivating a \"gratitude habit\" and shared what they would like to incorporate into their daily routine \"positive self talk\". Identified her mother as a significant support. Independent to participate in multi-step creative expression task (card making) r/t support.        [id number] OT Group (2 or more in attendance)      Patient Active Problem List   Diagnosis    IUD (intrauterine device) in place    Depression    MARIVEL (generalized anxiety disorder)    Polysubstance abuse (H)    Moderate major depression (H)    Nasal obstruction    Closed fracture of nasal bone, initial encounter    Deviated nasal septum    Hypertrophy of both inferior nasal turbinates    History of fracture of nasal bone    Acquired nasal deformity    Severe episode of recurrent major depressive disorder, without psychotic features (H)    Generalized anxiety disorder    Intentional benzodiazepine overdose (H)    Suicide attempt (H)         "

## 2025-03-17 NOTE — PLAN OF CARE
Problem: Sleep Disturbance  Goal: Adequate Sleep/Rest  Outcome: Progressing   Goal Outcome Evaluation:     NOC 3/16/25-3/17/2025    Focus: Shift summary    Data: Patient slept 6.5 hours last night. No interventions needed. Continues on 1:1 SIO at this time for fall risk. Respirations even and unlabored on status 15 checks. Will continue to monitor and report to oncoming staff.    Response: Report sleep hours to day shift. Continue to monitor patient and provide therapeutic interventions as necessary.

## 2025-03-17 NOTE — PSYCH
Staff called to request prn medication for sleep. Report that patient has had melatonin but it was ineffective

## 2025-03-17 NOTE — PLAN OF CARE
Team Note Due:    Assessment/Intervention/Current Symptoms and Care Coordination  Chart review and met with team, discussed pt progress, symptomology, and response to treatment.  Discussed the discharge plan and any potential impediments to discharge.    CTC connected with CD  about Sober living options. CD  to meet with pt on 3/18.     Discharge Plan or Goal  Pending stabilization & development of a safe discharge plan.    Dispo Plan: [facility] programing   Discharge Date/ time:  Transportation:   AVS Completed: Yes [] No[x]  Provisional Discharge: Yes[] No[x]    Barriers to Discharge   Patient requires further psychiatric stabilization due to current symptomology    Referral Status  Substance Use Programming    Legal Status  Patient is voluntary        Contacts:        Upcoming Meetings and Dates/Important Information and next steps:  CTC to work with CD  for referrals.

## 2025-03-17 NOTE — PLAN OF CARE
BEH IP Unit Acuity Rating Score (UARS)  Patient is given one point for every criteria they meet.    CRITERIA SCORING   On a 72 hour hold, court hold, committed, stay of commitment, or revocation. 0    Patient LOS on BEH unit exceeds 20 days. 0  LOS: 2   Patient under guardianship, 55+, otherwise medically complex, or under age 11. 0   Suicide ideation without relief of precipitating factors. 0   Current plan for suicide. 0   Current plan for homicide. 0   Imminent risk or actual attempt to seriously harm another without relief of factors precipitating the attempt. 0   Severe dysfunction in daily living (ex: complete neglect for self care, extreme disruption in vegetative function, extreme deterioration in social interactions). 1   Recent (last 7 days) or current physical aggression in the ED or on unit. 0   Restraints or seclusion episode in past 72 hours. 0   Recent (last 7 days) or current verbal aggression, agitation, yelling, etc., while in the ED or unit. 0   Active psychosis. 0   Need for constant or near constant redirection (from leaving, from others, etc).  0   Intrusive or disruptive behaviors. 0   Patient requires 3 or more hours of individualized nursing care per 8-hour shift (i.e. for ADLs, meds, therapeutic interventions). 1   TOTAL 2

## 2025-03-17 NOTE — PLAN OF CARE
"Individual Therapy Note      Date of Service: March 17, 2025    Patient: Lamar goes by \"Lamar,\" uses she/her pronouns    Individuals Present: Lamar Redman Lucasmichael, [facility]    Session start: 1145  Session end: 1225  Session duration in minutes: 40      Modality Used: CBT, Rapport Building, and Motivational Interviewing    Goals: Improve distress tolerance    Patient Description of current symptoms: going insane     Mental Status Exam:   Attitude: cooperative  Eye Contact: good  Mood: sad  and depressed  Affect: heightened and dramatic  Speech: clear, coherent  Psychomotor Behavior: no evidence of tardive dyskinesia, dystonia, or tics  Thought Process:  logical  Associations: no loose associations  Thought Content: no evidence of suicidal ideation or homicidal ideation  Insight: fair  Judgement: fair  Attention Span and Concentration: intact    Pt progress: Patient was very distressed at the start of session and crying about being in the hospital. She doesn't want to be here and doesn't think she needs to be here. She wants to go to a sober house and do outpatient programming. She admitted that she tried to kill herself by overdose and told her mom right away. She reported that she did it for attention.  Patient stated that she doesn't want to die. She is very impulsive in many areas of life. What helps her is being close to family. She is currently sad and wants to be with her family. Writer told her that her family can visit here and she can call them. She reported that she did talk to mom on the phone and that mom yelled at her. Patient reports having terrible relationships and no one trusts her because she lies. She reported lying to keep herself safe. She lies about her use and tell others she stopped using when she is using. She does want to stop using. We discussed her warning signs and triggers which she identified as isolation, poor hygiene, self hate and loneliness. We identified what she feels she needs that " leads to use. She reported attention and acceptance. We discussed her strengths which she identified as making people happy, strong, artistic, and knowing her own mind. She acknowledges that she is not doing what makes her happy. Writer encouraged patient to use her strengths on the unit and do something to make others happy. Writer shared that when we give to others what we are wanting it often comes back to us. We discussed core beliefs and gratitude. Writer gave patient hand outs on both to complete before next session.    Treatment Objective(s) Addressed:   The focus of this session was on rapport building, orienting the patient to therapy, identifying and practicing coping strategies, and processing feelings related to hospitalization      Progress Towards Goals and Assessment of Patient:   Patient is not making progress towards treatment goals as evidenced by new admission and evaluation is in process.       Therapeutic Intervention(s):   Provided active listening, unconditional positive regard, and validation.   Understand and identify reasons for hospitalization, how to use the hospital to create change and prevent future hospitalizations , Engaged in guided discovery, explored patient's perspectives and helped expand them through socratic dialogue, Taught the link between thoughts, feelings, and behaviors, and Using CBT tools and instruction to improve insight, awareness, identifying unhealthy patterns and self-talk and replacing with healthier patterns and self-talk    Safety Plan: Not yet started; patient unable to participate at this time    Plan/next step: Meet in next two days to work on coping skills    16035 - Psychotherapy (with patient) - 45 (38-52*) min    Patient Active Problem List   Diagnosis    IUD (intrauterine device) in place    Depression    MARIVEL (generalized anxiety disorder)    Polysubstance abuse (H)    Moderate major depression (H)    Nasal obstruction    Closed fracture of nasal bone,  initial encounter    Deviated nasal septum    Hypertrophy of both inferior nasal turbinates    History of fracture of nasal bone    Acquired nasal deformity    Severe episode of recurrent major depressive disorder, without psychotic features (H)    Generalized anxiety disorder    Intentional benzodiazepine overdose (H)    Suicide attempt (H)

## 2025-03-17 NOTE — PROGRESS NOTES
ENDOSCOPY DISCHARGE INSTRUCTIONS      The findings from you procedure(s) today included:  Diverticulosis present without diverticulitis.  Internal hemorrhoids.  Sigmoid colon polyp, removed          You Have Been Given Sedation Medications. For the Next 24 Hours:   Due to the sedation you received, you may not remember the procedure or the doctor's explanation afterward.  Our medication sometimes causes dizziness, drowsiness and impaired judgment.  Therefore, please follow these recommendations:  DO NOT drive a car or operate machinery.  Have a responsible adult drive you directly home.  DO NOT make critical decisions or sign any legal document.  DO NOT drink Alcohol.  DO NOT return to work or perform any tasks that require coordinated activity.    Special Instructions:    You may experience some mild abdominal bloating/distension or cramps due to air used to inflate the stomach during the procedure.  This is common and can be relieved by walking, belching and passing gas.  Diet:  Return to preprocedure diet  Unless otherwise indicated:  Continue current medications without changes There is no need to hold any aspirin or anti-inflammatory medications.    New Instructions:  High-fiber diet, begin gradually      Call  [phone] If You Have Any Of The Following:  Severe or worsening pain/discomfort in abdomen ( You may have some mild abdominal  discomfort which should pass within 1-2 hours with the passage of air by belching or passing gas from below) If severe or worsening, call 911  Nausea and/or vomiting  Difficulty swallowing or breathing, chest pain, or shortness of breath--call 911  Temperature above 100 degrees Fahrenheit  New/increased bleeding from rectum (more than blood streaking on toilet tissue), or jose guadalupe  Black or tarry stools--if severe, call 911  Abdominal bloating/distension not relieved by belching or passing gas-  If you are having these problems and unable to reach the on call nurse or doctor,     03/17/25 1502   Individualization/Patient Specific Goals   Patient Vulnerabilities history of unsuccessful treatment;occupational insecurity;poor impulse control;lacks insight into illness;substance abuse/addiction;poor physical health   Interprofessional Rounds   Summary There was discussion about the events that lead to pt's admission   Participants advanced practice nurse;nursing;OT;CTC;psychotherapy   Behavioral Team Discussion   Participants Tawanda Maldonado APRN; Markus RN; CALE Santillan; Meghan Painter, OT; Юлия Lombardi, Therapist   Progress Minimal. Recent admit   Anticipated length of stay 3 to 5 days   Continued Stay Criteria/Rationale Medication adjustments   Medical/Physical See H&P   Precautions See below   Plan Stablize on medications and discharge to [facility] programming   Rationale for change in precautions or plan Intial plan   Safety Plan To be completed prior to discharge.   Anticipated Discharge Disposition substance use treatment     PRECAUTIONS AND SAFETY    Behavioral Orders   Procedures    Code 1 - Restrict to Unit    Routine Programming     As clinically indicated    Self Injury Precaution    Status 15     Every 15 minutes.    Suicide precautions: Suicide Risk: MODERATE; Clinical rationale to override score: lack of access to a plan for self-harm     Patients on Suicide Precautions should have a Combination Diet ordered that includes a Diet selection(s) AND a Behavioral Tray selection for Safe Tray - with utensils, or Safe Tray - NO utensils       Order Specific Question:   Suicide Risk     Answer:   MODERATE     Order Specific Question:   Clinical rationale to override score:     Answer:   lack of access to a plan for self-harm       Safety  Safety WDL: WDL  Patient Location: patient room, own  Observed Behavior: calm  Safety Measures: safety rounds completed  Suicidality: Status 15, SIO (Status Individual Observation)  (NOTE - order will specify distance (fall risk)          call 911 or have someone take you to the nearest emergency room immediately    Follow Up Plan:  If biopsies or other specimens were taken, results will be called/mailed to you within 7-10 days.  If you have not heard from the medical center or your referring provider within 10 days, call  [phone]  for the results.    Make an appointment for a repeat colonoscopy in 3 years. This recommendation may be modified once pathology is known.    Note:While some recommendations above are being made in order to begin treatment of the symptoms and procedure findings to avoid delays, these are interim recommendations, and NO ongoing doctor- patient relationship is being formed with Dr. Rutledge, who is serving today as your endoscopy doctor, but will not be rendering care beyond this. You need to follow up in a timely manner with your referring doctor or provider and follow through on any other referrals to healthcare providers, in regard to all current and future medical issues, including any symptoms or signs that prompted this endoscopic evaluation.The findings on this examination today do not necessarily explain or serve as the sole conclusive results regarding any of your signs or symptoms.     Matthew Rutledge MD [facility]

## 2025-03-17 NOTE — PLAN OF CARE
Group Attendance:  attended partial group    Time session began: 1415  Time session ended: 1510  Patient's total time in group: 20    Total # Attendees   6   Group Type psychotherapeutic and psychoeducational     Group Topic Covered relationships and healthy coping skills     Group Session Detail Patient attended a psycho-education group on the topic of boundaries. Patients learned the definition of a boundary, the areas of personal responsibility, how to identify healthy and unhealthy boundaries and how to change unhealthy boundaries into healthy boundaries. Patients worked together on how to set boundaries in various scenarios..      Patient's response to the group topic/interactions:  engaged socially when prompted     Patient Details: Patient came in late and was encouraged to pull up a chair and sit at the table with the group. She chose to sit in the periphery. She was quiet, only engaging when called upon.Patient left group early.           No Charge (0-15 min)  Patient Active Problem List   Diagnosis    IUD (intrauterine device) in place    Depression    MARIVEL (generalized anxiety disorder)    Polysubstance abuse (H)    Moderate major depression (H)    Nasal obstruction    Closed fracture of nasal bone, initial encounter    Deviated nasal septum    Hypertrophy of both inferior nasal turbinates    History of fracture of nasal bone    Acquired nasal deformity    Severe episode of recurrent major depressive disorder, without psychotic features (H)    Generalized anxiety disorder    Intentional benzodiazepine overdose (H)    Suicide attempt (H)

## 2025-03-17 NOTE — PLAN OF CARE
Problem: Suicide Risk  Goal: Absence of Self-Harm  Outcome: Progressing     Goal Outcome Evaluation:    /70 (BP Location: Left arm, Patient Position: Sitting, Cuff Size: Adult Regular)   Pulse 80   Temp 98.1  F (36.7  C) (Oral)   Resp 16   SpO2 99%     Pt presented as alert and oriented to person, place, time and situation. She was pleasant with a bright affect. Her speech was clear and coherent with organized thought process. She was up and about on the unit independently. Gait is steady. Pt reported eating, drinking and sleeping adequately. She denied anxiety, depression, SI/SIB/HI and A/V hallucinations. Pt denied any physical pain. Mood was calm with a full range affect. She attended group therapy. Pt is medication compliant, denied any medication adverse reaction. During medication administration pt asked if she can have her dose of gabapentin increased, was notified that her request will be forwarded to her attending provider. No further concerns voiced. Behavior is controlled.

## 2025-03-17 NOTE — PLAN OF CARE
Rehab Group    Start time: 1020  End time: 1200  Patient time total: 60 minutes    attended partial group    #9 attended   Group Type: occupational therapy   Group Topic Covered: balanced lifestyle, cognitive activities, coping skills, healthy leisure time, problem solving, and social skills       Group Session Detail:  OT CLINIC     Patient Response/Contribution:  cooperative with task, organized, safe use of materials/supplies, actively engaged, and engaged socially when prompted       Patient Detail:    Occupational therapy clinic to facilitate coping skill exploration, creative expression within personally meaningful activities, and clinical observation of social, cognitive, and kinesthetic performance skills. Pt response: Pt presented with blunted, flat affect. Guarded mood/presentation. IND to initiate, gather materials, sequence, and adjust to workspace demands as needed for a mixed-media creative expressive task. Demonstrated good focus and task planning. Pt engaged on the periphery, minimally social with staff/peers.         [id number] OT Group (2 or more in attendance)      Patient Active Problem List   Diagnosis    IUD (intrauterine device) in place    Depression    MARIVEL (generalized anxiety disorder)    Polysubstance abuse (H)    Moderate major depression (H)    Nasal obstruction    Closed fracture of nasal bone, initial encounter    Deviated nasal septum    Hypertrophy of both inferior nasal turbinates    History of fracture of nasal bone    Acquired nasal deformity    Severe episode of recurrent major depressive disorder, without psychotic features (H)    Generalized anxiety disorder    Intentional benzodiazepine overdose (H)    Suicide attempt (H)

## 2025-03-18 PROCEDURE — 128N000002 HC R&B CD/MH ADOLESCENT

## 2025-03-18 PROCEDURE — 99232 SBSQ HOSP IP/OBS MODERATE 35: CPT | Performed by: NURSE PRACTITIONER

## 2025-03-18 PROCEDURE — 90834 PSYTX W PT 45 MINUTES: CPT | Performed by: PSYCHOLOGIST

## 2025-03-18 PROCEDURE — 97150 GROUP THERAPEUTIC PROCEDURES: CPT | Mod: GO

## 2025-03-18 PROCEDURE — H2032 ACTIVITY THERAPY, PER 15 MIN: HCPCS

## 2025-03-18 PROCEDURE — 250N000013 HC RX MED GY IP 250 OP 250 PS 637: Performed by: NURSE PRACTITIONER

## 2025-03-18 PROCEDURE — 250N000013 HC RX MED GY IP 250 OP 250 PS 637: Performed by: REGISTERED NURSE

## 2025-03-18 PROCEDURE — 90853 GROUP PSYCHOTHERAPY: CPT | Performed by: PSYCHOLOGIST

## 2025-03-18 RX ORDER — GABAPENTIN 600 MG/1
600 TABLET ORAL 3 TIMES DAILY
Status: DISCONTINUED | OUTPATIENT
Start: 2025-03-18 | End: 2025-03-20 | Stop reason: HOSPADM

## 2025-03-18 RX ORDER — NICOTINE 21 MG/24HR
1 PATCH, TRANSDERMAL 24 HOURS TRANSDERMAL DAILY
Status: DISCONTINUED | OUTPATIENT
Start: 2025-03-18 | End: 2025-03-20 | Stop reason: HOSPADM

## 2025-03-18 RX ORDER — HYDROXYZINE HYDROCHLORIDE 25 MG/1
25 TABLET, FILM COATED ORAL EVERY 4 HOURS PRN
Status: DISCONTINUED | OUTPATIENT
Start: 2025-03-18 | End: 2025-03-20 | Stop reason: HOSPADM

## 2025-03-18 RX ORDER — CLONIDINE HYDROCHLORIDE 0.1 MG/1
0.1 TABLET ORAL EVERY 6 HOURS PRN
Status: DISCONTINUED | OUTPATIENT
Start: 2025-03-18 | End: 2025-03-20 | Stop reason: HOSPADM

## 2025-03-18 RX ADMIN — GABAPENTIN 600 MG: 600 TABLET, FILM COATED ORAL at 22:17

## 2025-03-18 RX ADMIN — NICOTINE POLACRILEX 2 MG: 2 GUM, CHEWING BUCCAL at 12:55

## 2025-03-18 RX ADMIN — RAMELTEON 8 MG: 8 TABLET, FILM COATED ORAL at 22:17

## 2025-03-18 RX ADMIN — NICOTINE 1 PATCH: 21 PATCH, EXTENDED RELEASE TRANSDERMAL at 12:54

## 2025-03-18 RX ADMIN — CLONIDINE HYDROCHLORIDE 0.1 MG: 0.1 TABLET ORAL at 09:27

## 2025-03-18 RX ADMIN — GABAPENTIN 400 MG: 400 CAPSULE ORAL at 08:04

## 2025-03-18 RX ADMIN — GABAPENTIN 600 MG: 600 TABLET, FILM COATED ORAL at 13:37

## 2025-03-18 RX ADMIN — HYDROXYZINE HYDROCHLORIDE 25 MG: 25 TABLET, FILM COATED ORAL at 15:12

## 2025-03-18 RX ADMIN — CLONIDINE HYDROCHLORIDE 0.1 MG: 0.1 TABLET ORAL at 16:10

## 2025-03-18 RX ADMIN — NICOTINE POLACRILEX 2 MG: 2 GUM, CHEWING BUCCAL at 20:43

## 2025-03-18 RX ADMIN — NICOTINE POLACRILEX 2 MG: 2 GUM, CHEWING BUCCAL at 14:14

## 2025-03-18 RX ADMIN — NICOTINE 1 PATCH: 14 PATCH, EXTENDED RELEASE TRANSDERMAL at 08:07

## 2025-03-18 RX ADMIN — TRAZODONE HYDROCHLORIDE 150 MG: 100 TABLET ORAL at 22:17

## 2025-03-18 RX ADMIN — VENLAFAXINE HYDROCHLORIDE 150 MG: 150 CAPSULE, EXTENDED RELEASE ORAL at 08:05

## 2025-03-18 ASSESSMENT — ACTIVITIES OF DAILY LIVING (ADL)
ADLS_ACUITY_SCORE: 20

## 2025-03-18 NOTE — PLAN OF CARE
Problem: Sleep Disturbance  Goal: Adequate Sleep/Rest  Outcome: Progressing   Goal Outcome Evaluation:    Patient slept for 6.75 hours last night. No pain or discomfort. Safety checks done per protocol. No occurrences. No behavior or safety concerns. Will continue to monitor.

## 2025-03-18 NOTE — PROGRESS NOTES
[facility]  [address] [place] [id number]      3/18/2025      Lamar Rosas  [address] [address]PL[address]ER [facility] [address]      Dear Ms. Salcedoger,    Here are some programs to look into.  Since you completed a [facility] Comprehensive Assessment with me, you can let me know which program you would like to attend, and I will fax your assessment to the program of choice for you to get started. I will need you to sign a Release of Information before I fax your assessment. This Release of Information can be sent to you via [facility] (electronic) or via the Unit . If you have questions, my phone number is [phone].    Thanks,  Jen      Referrals/ Alternatives:  [facility]:  [address] [place] [address]  Phone: [phone]  Fax: [phone]  Admission Specialist: Ashley Gilbert  Phone: [phone]  email: [email or web address]  [email or web address]  Groups:  1) Morning IOP + Mother's Group: Mon-Thurs 9am-noon  2) Morning In Person Group: Mon-Thurs 9am-noon  3) Morning Telehealth Group: Mon-Thurs 9am-noon  4) Afternoon Telehealth Group: Mon-Thurs 1-3pm  5) Evening Group: Mon-Thurs 5-8pm  IOP with Loging:   -32 beds    Transitions: (IOP with lodging- [facility])  [address] [place] [address]  Phone: [phone]  Fax: [phone]  email: [email or web address]  [email or web address]  *Group Monday through Friday 8:00am to 12:00pm    [place]: IOP w/ Lodging  Phone: [phone]  Email: [email or web address]   *Program length is 3-6 months & housing is provided (no kids)  *18 hours of programming each week (Monday-Wednesday, 8 am - 2 pm)  *Transportation is provided from housing to programming  *Programming will take place in-person at [facility], [address] [place] [address], [address], [place], [place]  *Includes weekly individual counseling session    [facility] w/Lodging  [address] [place] [address]  Phone: [phone]  Fax: [phone]   Email: [email or web address]  [email or web address]      Nyasia Cruz MA [facility]  CD Evaluation Counselor  [phone]    (Emailed to unit sw today)

## 2025-03-18 NOTE — PLAN OF CARE
Rehab Group    Start time: 1015  End time: 1100  Patient time total: 30 minutes    attended partial group    #9 attended   Group Type: occupational therapy   Group Topic Covered: balanced lifestyle, cognitive activities, coping skills, healthy leisure time, problem solving, and social skills       Group Session Detail:  OT CLINIC     Patient Response/Contribution:  cooperative with task, organized, and actively engaged       Patient Detail:    Occupational therapy clinic to facilitate coping skill exploration, creative expression within personally meaningful activities, and clinical observation of social, cognitive, and kinesthetic performance skills. Pt response: Pt presented with flat affect. IND to initiate, gather materials, sequence, and adjust to workspace demands as needed to resume working on her mixed media creative expressive task. IND With all performance skills. Demonstrated good focus. Pleasant participant but minimally social with peers/staff.         [id number] OT Group (2 or more in attendance)      Patient Active Problem List   Diagnosis    IUD (intrauterine device) in place    Depression    MARIVEL (generalized anxiety disorder)    Polysubstance abuse (H)    Moderate major depression (H)    Nasal obstruction    Closed fracture of nasal bone, initial encounter    Deviated nasal septum    Hypertrophy of both inferior nasal turbinates    History of fracture of nasal bone    Acquired nasal deformity    Severe episode of recurrent major depressive disorder, without psychotic features (H)    Generalized anxiety disorder    Intentional benzodiazepine overdose (H)    Suicide attempt (H)

## 2025-03-18 NOTE — PLAN OF CARE
Rehab Group    Start time: 1410  End time: 1455  Patient time total: 40 minutes    attended full group    #6 attended   Group Type: occupational therapy   Group Topic Covered: balanced lifestyle, cognitive activities, coping skills, healthy leisure time, problem solving, and social skills       Group Session Detail:  Cognitive Leisure Task     Patient Response/Contribution:  positive affect, cooperative with task, organized, socially appropriate, expressed understanding of topic, attentive, and actively engaged       Patient Detail:    Pt actively participated in a structured occupational therapy group with a focus on a visual-spatial leisure task. Pt was able to follow 2-step directions of the novel task, and demonstrated strategic planning and problem solving throughout the task. Pt remained focused and engaged for the full duration of group. Observed actively planning ahead and tracking the task consistently. Pts affect was softer this afternoon - less irritability observed. Appeared more at ease in interaction with peers/staff.         [id number] OT Group (2 or more in attendance)      Patient Active Problem List   Diagnosis    IUD (intrauterine device) in place    Depression    MARIVEL (generalized anxiety disorder)    Polysubstance abuse (H)    Moderate major depression (H)    Nasal obstruction    Closed fracture of nasal bone, initial encounter    Deviated nasal septum    Hypertrophy of both inferior nasal turbinates    History of fracture of nasal bone    Acquired nasal deformity    Severe episode of recurrent major depressive disorder, without psychotic features (H)    Generalized anxiety disorder    Intentional benzodiazepine overdose (H)    Suicide attempt (H)

## 2025-03-18 NOTE — PLAN OF CARE
"INITIAL OT ASSESSMENT      03/17/25 1200   General Information   Date Initially Attended OT 03/17/25   Clinical Impression   Affect Flat;Irritable   Orientation Oriented to person, place and time   Appearance and ADLs General cleanliness observed in most areas   Attention to Internal Stimuli No observed signs   Interaction Skills Guarded   Ability to Communicate Needs Independent   Verbal Content Clear;Appropriate to topic;Superficial;Selective   Ability to Maintain Boundaries Maintains appropriate physical boundaries;Maintains appropriate verbal boundaries   Participation Participates with minimal encouragement   Concentration Concentrates 20-30 minutes   Ability to Concentrate With structure   Follows and Comprehends Directions Independently follows multi-step directions   Memory Delayed and immediate recall intact   Organization Independently organizes all tasks   Decision Making Independent   Planning and Problem Solving Indentifies problems but not alternatives;Occasionally needs assist/feedback   Ability to Apply and Learn Concepts Comprehends concepts, but needs assist to apply   Frustrations / Stress Tolerance Independently identifies sources of frustration/stress   Level of Insight Other (see comments)  (limited)   Self Esteem Can identify positives;Takes risks with support and encouragement   Social Supports Has knowledge of support systems   BEH OT Care Plan Goals   OT Care Plan coping with symptoms     Patient Self-Assessment: Pt was given and completed a written self-assessment form. OT staff reviewed with pt and explained the value of having them involved in their treatment plan, and provided options to meet current needs/self-identified goals.     What do you do during the day/evening? \"Paint, snowboard, hangout with friends\"    What stressors do you face that trigger symptoms/substance use? \"Isolation, lack of routine\"    Who are supportive people you enjoy spending time with? \"Mom, sisters\"    What " "are your positive qualities? \"Helping others, sharing, listening\"    What helps you to calm down or relax? \"Sleep, cry\"    Coping Skills you'd like to explore in OT: none selected     Guided Relaxation / Meditation    Physical Wellness / Exercise / Yoga / Jacques Chi    Cooking / Baking (may not be an offered group)    Journaling / Coloring / Drawing    Arts & Crafts    Group Games    Mental Health Management / Discussion    Others:      What are your goals for when you leave the hospital?    x Engage in OT group activities that support recovery     Work on self-cares to improve wellness    Practice using coping strategies to manage stress and reduce symptoms    Participate in healthy, meaningful leisure activities    Share thoughts and/or feelings on mental health or substance use    Improve focus and concentration during tasks    Communicate needs effectively    Increase confidence and self-esteem    Other:      PLAN: Will provide structure, support, and encouragement. Offer education on coping strategies and life management skills. Assist pt to increase self awareness regarding mental health issues. Will assess further in the areas of organization, problem solving, and concentration.       "

## 2025-03-18 NOTE — PROGRESS NOTES
"[facility],  Psychiatric Progress Note      Interim History:     From H&P 3/16/2025:  This patient is a 24 year old  female with a significant past psychiatric history of  depression, anxiety, and substance use  who presented to ED after recent overdose. She reports she was at her sober home and did not have anyone to hang out with and then used heroin. She currently is not the best historian and is very fatigued. She has SIO 1:1 for safety for falls precautions. She has been mostly sleeping in bed since arriving to unit. She is still disoriented and confused, not giving much information when asked questions. She has been eating. She endorses recent feelings of depression and endorses anxiety. She denies cravings today and reports goal of wanting to get sober again and \"be happy\". Provider discussed medications and reducing current gabapentin dose due to her lethargy.     The patient's care was discussed with the treatment team and chart notes were reviewed.  Pt was documented as sleeping 6.75 hours during the overnight shift.  Yesterday she attended 3 groups and was cooperative, organized, attentive and engaged but was minimally social.  She met with the individual therapist and expressed frustration about being in the hospital, stating she would like to live in a sober house and participate in outpatient programming.  She said that her mother yelled at her during a phone conversation.  Pt was tearful during conversation with provider.  She is upset that she is hospitalized.  \"I just want to leave and I can't until I have a plan.\"  She is willing to go to CD treatment but wants to discharge ASAP.  She is aware that CD consult is scheduled for today.  Pt reports longest period of sobriety is 18 months.  She said that spending time with sober friends helped her maintain sobriety.  Her sleep and appetite are good.  She reports feeling anxious.  Pt requested an increase in " Neurontin.  It was previously 800 mg TID but reduced due to somnolence following benzodiazepine overdose.  Increased from 400 mg to 600 mg TID.  She asked why she has not been receiving PRN Clonidine, which she finds more beneficial than PRN Hydroxyzine.  Changed PRN Clonidine to first line treatment for anxiety.         Diagnoses:     Major depressive disorder, recurrent, moderate  Generalized anxiety disorder  Benzodiazepine use disorder  Alcohol use disorder  Nicotine use disorder  Benzodiazepine overdose 3/14/2025         Plan:     Medications:  Venlafaxine  mg daily  Gabapentin increase to 600 mg TID (PTA dose 800 mg TID)  Trazodone 150 mg for sleep  Rozerem 8 mg for sleep   PRNs: Clonidine for anxiety (first line treatment), Hydroxyzine for anxiety, Olanzapine for agitation    Legal status: voluntary but holdable as she is not safe for discharge     Routine lab reviewed. UTOX positive for amphetamines and benzodiazepines.      Monitor for target symptoms.      Provide a safe environment and therapeutic milieu.     Patient may benefit from groups and unit therapy for coping skills and safety plan.      CD assessment completed 3/18.  She was referred to [facility], [facility] with lodging, [facility] with lodging and [facility].    Plan for discharge to  treatment.       Attestation:  Patient has been seen and evaluated by me, SHADI Patel CNP  The patient was counseled on nature of illness and treatment plan/options  Care was coordinated with treatment team  Total amount of time: >35 minutes         Medications:     Current Facility-Administered Medications   Medication Dose Route Frequency Provider Last Rate Last Admin    acetaminophen (TYLENOL) tablet 650 mg  650 mg Oral Q4H PRN Tawanda Maldonado APRN CNP        alum & mag hydroxide-simethicone (MAALOX) suspension 30 mL  30 mL Oral Q4H PRN Tawanda Maldonado APRN CNP        cloNIDine (CATAPRES) tablet 0.1 mg  0.1  mg Oral Daily PRN Tawanda Maldonado APRN CNP        gabapentin (NEURONTIN) capsule 400 mg  400 mg Oral TID Tawanda Maldonado APRN CNP   400 mg at 03/17/25 1936    hydrOXYzine HCl (ATARAX) tablet 25 mg  25 mg Oral Q4H PRN Tawanda Maldonado APRN CNP        nicotine (NICODERM CQ) 14 MG/24HR 24 hr patch 1 patch  1 patch Transdermal Daily Tawanda Maldonado APRN CNP   1 patch at 03/17/25 0759    nicotine (NICORETTE) gum 2 mg  2 mg Buccal Q1H PRN Tawanda Maldonado APRN CNP   2 mg at 03/16/25 1442    OLANZapine (zyPREXA) tablet 10 mg  10 mg Oral TID PRN Tawanda Maldonado APRN CNP        Or    OLANZapine (zyPREXA) injection 10 mg  10 mg Intramuscular TID PRN Tawanda Maldonado APRN CNP        ramelteon (ROZEREM) tablet 8 mg  8 mg Oral At Bedtime Tawanda Maldonado APRN CNP   8 mg at 03/17/25 1936    senna-docusate (SENOKOT-S/PERICOLACE) 8.6-50 MG per tablet 1 tablet  1 tablet Oral BID PRN Tawanda Maldonado APRN CNP        traZODone (DESYREL) tablet 150 mg  150 mg Oral At Bedtime Tawanda Maldonado APRN CNP   150 mg at 03/17/25 1937    venlafaxine (EFFEXOR XR) 24 hr capsule 150 mg  150 mg Oral Daily with breakfast Tawanda Maldonado APRN CNP   150 mg at 03/17/25 0759             Allergies:     Allergies   Allergen Reactions    Seasonal Allergies           Psychiatric Examination:     /67   Pulse 95   Temp 97.8  F (36.6  C) (Temporal)   Resp 16   SpO2 97%   Weight is 0 lbs 0 oz  There is no height or weight on file to calculate BMI.    Appearance:  awake, alert, dressed in hospital scrubs, and slightly unkempt  Attitude: cooperative  Eye Contact:  fair   Mood:  anxious, frustrated  Affect:  tearful  Speech:  clear, coherent  Psychomotor Behavior:  no evidence of tardive dyskinesia, dystonia, or tics and fidgeting  Thought Process:  goal oriented, linear  Associations:  no loose associations  Thought Content:  no evidence of suicidal ideation or homicidal ideation, no auditory hallucinations present, and no visual hallucinations  "present  Insight:  limited  Judgment:  fair  Oriented to:  date, time, person, place  Attention Span and Concentration:  intact  Recent and Remote Memory:  fair to good         Labs:   No results found for: \"NTBNPI\", \"NTBNP\"  Lab Results   Component Value Date    WBC 6.6 03/14/2025    HGB 13.9 03/14/2025    HCT 38.4 03/14/2025    MCV 87 03/14/2025     03/14/2025     Lab Results   Component Value Date    GFRESTIMATED 79 03/14/2025    GFRESTBLACK >90 05/14/2020     Lab Results   Component Value Date    GLC 75 03/14/2025     Lab Results   Component Value Date    HGB 13.9 03/14/2025     Lab Results   Component Value Date    AST 15 03/15/2025    ALT 13 03/15/2025    ALKPHOS 74 03/15/2025    BILITOTAL 0.5 03/15/2025     Lab Results   Component Value Date    TSH 0.87 08/11/2021     Lab Results   Component Value Date    WBC 6.6 03/14/2025     "

## 2025-03-18 NOTE — PLAN OF CARE
"  Problem: Adult Behavioral Health Plan of Care  Goal: Plan of Care Review  Outcome: Not Progressing   Goal Outcome Evaluation:    Patient up for morning routine.  Asked for her medications.  Affect is flat.  Shortly later, became very tearful--wanting to use the internet, wanting to go home.  (Wanted the internet to look into sober housing.)  Currently denies SI/SIB or thoughts to hurt others.  Rated depression as \"low\", but anxiety as \"high\".  Denies hallucinations     Patient was given a phone number for [facility] which she requested.  Calmed and was napping.  0930: requested and received Catapres for anxiety.    Waiting for CD assessment.    Denies concerns regarding sleep, appetite, medication side effects.      "

## 2025-03-18 NOTE — PLAN OF CARE
"Individual Therapy Note      Date of Service: March 18, 2025    Patient: Lamar goes by \"Lamar,\" uses she/her pronouns    Individuals Present: Lamar Redman [facility]asmichael [facility]    Session start: 1125  Session end: 1210  Session duration in minutes: 40      Modality Used: DBT, Rapport Building, and Motivational Interviewing    Goals: Improve distress tolerance and interpersonal skills    Patient Description of current symptoms: sad and lonely     Mental Status Exam:   Attitude: cooperative  Eye Contact: fair  Mood: sad  and depressed  Affect: intensity is heightened and intensity is dramatic  Speech: increased speech latency  Psychomotor Behavior: no evidence of tardive dyskinesia, dystonia, or tics  Thought Process:  logical  Associations: no loose associations  Thought Content: no evidence of suicidal ideation or homicidal ideation  Insight: fair  Judgement: fair  Attention Span and Concentration: intact    Pt progress: Patient started crying as soon as the session started. She reported that she hates being here and feels lonely. She reported having social anxiety and a lot of fear. We discussed gradually moving toward things she is afraid of and improving social skills. We practiced grounding skills to help alleviate anxiety and intense emotions. Patient calmed and was smiling by the end of the session. Writer encouraged patient to attend groups and practice social skills.    Treatment Objective(s) Addressed:   The focus of this session was on rapport building, orienting the patient to therapy, identifying and practicing coping strategies, and processing feelings related to sadness and loneliness      Progress Towards Goals and Assessment of Patient:   Patient is making progress towards treatment goals as evidenced by engaging in treatment.       Therapeutic Intervention(s):   Provided active listening, unconditional positive regard, and validation.   Coached on coping techniques/relaxation skills to help improve distress " tolerance and managing intense emotions. , Identified and practiced coping skills, and Engaged in guided discovery, explored patient's perspectives and helped expand them through socratic dialogue    Safety Plan: Not yet started; patient unable to participate at this time    Plan/next step: Meet tomorrow to practice social skills    35014 - Psychotherapy (with patient) - 45 (38-52*) min    Patient Active Problem List   Diagnosis    IUD (intrauterine device) in place    Depression    MARIVEL (generalized anxiety disorder)    Polysubstance abuse (H)    Moderate major depression (H)    Nasal obstruction    Closed fracture of nasal bone, initial encounter    Deviated nasal septum    Hypertrophy of both inferior nasal turbinates    History of fracture of nasal bone    Acquired nasal deformity    Severe episode of recurrent major depressive disorder, without psychotic features (H)    Generalized anxiety disorder    Intentional benzodiazepine overdose (H)    Suicide attempt (H)

## 2025-03-18 NOTE — PLAN OF CARE
Problem: Adult Behavioral Health Plan of Care  Goal: Plan of Care Review  Outcome: Not Progressing   Goal Outcome Evaluation:    Entered in error.

## 2025-03-18 NOTE — PLAN OF CARE
BEH IP Unit Acuity Rating Score (UARS)  Patient is given one point for every criteria they meet.    CRITERIA SCORING   On a 72 hour hold, court hold, committed, stay of commitment, or revocation. 0    Patient LOS on BEH unit exceeds 20 days. 0  LOS: 3   Patient under guardianship, 55+, otherwise medically complex, or under age 11. 0   Suicide ideation without relief of precipitating factors. 0   Current plan for suicide. 0   Current plan for homicide. 0   Imminent risk or actual attempt to seriously harm another without relief of factors precipitating the attempt. 0   Severe dysfunction in daily living (ex: complete neglect for self care, extreme disruption in vegetative function, extreme deterioration in social interactions). 1   Recent (last 7 days) or current physical aggression in the ED or on unit. 0   Restraints or seclusion episode in past 72 hours. 0   Recent (last 7 days) or current verbal aggression, agitation, yelling, etc., while in the ED or unit. 0   Active psychosis. 0   Need for constant or near constant redirection (from leaving, from others, etc).  0   Intrusive or disruptive behaviors. 0   Patient requires 3 or more hours of individualized nursing care per 8-hour shift (i.e. for ADLs, meds, therapeutic interventions). 1   TOTAL 2

## 2025-03-18 NOTE — PLAN OF CARE
Problem: Suicidal Behavior  Goal: Suicidal Behavior is Absent or Managed  Outcome: Progressing   Goal Outcome Evaluation:      Plan of Care Reviewed With: patient  /67   Pulse 95   Temp 97.8  F (36.6  C) (Temporal)   Resp 16   SpO2 97%      Pt was visible in the milieu isolative to herself watching TV. Good appetite and fluid intake and ate about 100% of her dinner. Pt denied having pain, discomfort , dizziness and medication side effects. Pt was withdrawn with flat blunted affect. She was guarded giving short answers. She was unkempt. Pt lives in sober house for now. Pt denied having SI/SIB/AH/VH but endorsed anxiety 8/10 gave her schedule medication. Pt requested Vyvanse medication but she doesn't have an order.

## 2025-03-18 NOTE — PLAN OF CARE
Problem: Suicide Risk  Goal: Absence of Self-Harm  Outcome: Progressing   Goal Outcome Evaluation:      Plan of Care Reviewed With: patient  /66   Pulse 91   Temp 98.2  F (36.8  C) (Temporal)   Resp 16   Wt 59.1 kg (130 lb 6.4 oz)   SpO2 98%   BMI 20.42 kg/m       Pt was out in the hallway isolative to herself. Good appetite and fluid intake and ate about 90% of her dinner. Pt likes to eat in her room. Around 1600 pt was c/o anxiety rated 10/10 gave her clonidine and it was effective she took a nap after dinner. She was unkempt, withdrawn with flat blunted affect. Pt was guarded. Around bedtime her anxiety level was lower per pt. She participated in a group activity. Pt requested clonidine PRN at bedtime but her B/P was 96/63 pushed fluid and it went up to 108/68.

## 2025-03-18 NOTE — PROGRESS NOTES
"  Type Of Assessment: Inpatient Substance Use Comprehensive Assessment    Referral Source:  [facility]   MRN: [id number]    DATE OF SERVICE: March 18, 2025  Date of previous GIANA Assessment: 2025   Patient confirmed identity through two factor verification: Full Legal Name and SSN    PATIENT'S NAME: Lamar Rosas  Age: 24 year old  SSN: [id number]   Sex: female   Gender Identity: female   Sexual Orientation: Heterosexual  Cultural Background: \"white\"  YOB: 2000  Current Address:   [address] [address] [facility] [address]  Patient Phone Number:  [phone]   Patient's E-Mail Contact:  [email or web address]  Funding: Payor: [employer] / Plan: [employer] / Product Type: PPO /    PMI: [id number]    Emergency Contact: Extended Emergency Contact Information  Primary Emergency Contact: Abigail Rosas  [place] Phone: [phone]  Mobile Phone: [phone]  Relation: Mother    DAANES information was provided to patient and patient does not want a copy.     Telemedicine Visit: The patient's condition can be safely assessed and treated via synchronous audio and visual telemedicine encounter.    Reason for Telemedicine Visit: Services only offered telehealth  Originating Site (Patient Location): [address] [address]  Distant Site (Provider Location): Provider Remote Setting- Home Office  Consent:  The patient/guardian has verbally consented to: the potential risks and benefits of telemedicine (video visit) versus in person care; bill my insurance or make self-payment for services provided; and responsibility for payment of non-covered services.   Mode of Communication:  Phone    START TIME: 10:47am  END TIME: 11:17am    As the provider I attest to compliance with applicable laws and regulations related to telemedicine.   Lamar Rosas was seen for a substance use disorder " "consult on 3/18/2025 by Nyasia Nolan [facility].    Reason for Substance Use Disorder Consult:  Pt is interested in GIANA treatment at this time because \"Just tired.\"  Per 3/16/25 H&P:     Chief Complaint:   History is obtained from the patient and electronic health record  \"Relapsed\"          History of Present Illness:      Per ED note on 3/14/25:  Lamar Rosas is a 24 year old female, past medical history is significant for depression, polysubstance abuse, generalized anxiety disorder, depression, emergency department concerns of benzodiazepine overdose; clonazepam (some of her own recently prescribed a week ago and some from the \"dark web\") and Xanax(off the dark web as well as mom states she has never had Xanax prescribed to her).  Mom states that the patient was recently at [facility] for benzodiazepine abuse about a week ago.  Mom states that she is taken up with some unsavory character who likely facilitated her access to additional benzodiazepines without prescription.  Ingestion was approximately an hour or 2 ago they are not really sure.  They do not know how much she took.  They do not think that she has been drinking alcohol as well but I note upon reviewing the EHR that she has a history for alcohol intoxication acutely in 11/28/2024, 11/10/2024 within our system.  When I asked the patient why she took Xanax and clonazepam she shook her head that this was not a suicide attempt.  Mom expressed considerable frustration that they have been unable to get her the help that they need with mental illness.           Are you currently having severe withdrawal symptoms that are putting yourself or others in danger? No  Are you currently having severe medical problems that require immediate attention? No  Are you currently having severe emotional or behavioral problems that are putting yourself or others at risk of harm? No    Have you participated in prior substance use disorder evaluations? Yes. When, " "Where, and What circumstances: 2025  Have you ever been to detox, inpatient or outpatient treatment for substance related use? List previous treatment: Yes. When, Where, and What circumstances:  recently at [facility] 1 week ago for benzodiazepine abuse.    Have you ever had a gambling problem or had treatment for compulsive gambling? No  Have you ever felt the need to bet more and more money? No  Have you ever had to lie to people important to you about how much you gambled? No    Patient does not appear to be in severe withdrawal, an imminent safety risk to self or others, or requiring immediate medical attention and may proceed with the assessment interview.  Comprehensive Substance Use History   X X = Primary Drug Used Age of First Use    Pattern of Substance Use   (heaviest use in life and a use history within the past year if applicable) (DSM-5: Sx #3) Date /  Quantity of last use if within the past 30 days Withdrawal Potential?   Method of use  (Oral, smoked, snorted, IV, etc)   x Alcohol   10 HU: [place] and high school    She will use either alcohol or xanax. When she does drink, she will drink \"way too much.\" She drinks hard alcohol.   ~3 months ago no     Marijuana/Hashish   13 History of use.   16 no smoke    Cocaine/Crack  History of using cocaine a couple of times in her life.         Meth/Amphetamines   16 Vyvanse:  First rx: 16  Current dose: 50mg  Last use: 3/15/25  Abuse: no   3/15/25 no oral    Heroin   No use        Other Opiates/Synthetics   20 Fentanyl/percocet-30s:  HU: 19-20. Daily use. No intentional use since then.   4 years ago no smoke    Inhalants  No use       x Benzodiazepines   15 HU: unknown  Xanax:  Pt reports being \"sober for 18 months, relapsed for 3 months, sober for a month, and relapsed again.\"  Past month:   Klonopin (rx): she takes daily.  Current dose: 1mg twice a day.  Abuse: no, b/c she is very worried that if she does take more, someone will ask to count " "her current rx and she will get in trouble.    Xanax: off the street  If she can, she will use daily. She will use until she cannot use anymore.   3/15/25      Hallucinogens   teens History of use in her teens. teens no oral    Barbiturates/Sedatives/Hypnotics   No use        Over-the-Counter Drugs   No use        Other   No use        Nicotine   14 Vape: daily 3/18/25 no smoke     Withdrawal symptoms: Have you had any of the following withdrawal symptoms?  \"usually sleep for like days.\"    Have you experienced any cravings?  Yes    Have you had periods of abstinence?  Yes   What was your longest period? 18 months  How: \"my support system.\"  Any circumstances that lead to relapse? \"I moved.\"  What is going to help you stay sober? \"Just staying with my community.\"     What activities have you engaged in when using alcohol/other drugs that could be hazardous to you or others?  The patient denied engaging in any of the above dangerous activities when using alcohol and/or drugs.    A description of any risk-taking behavior, including behavior that puts the client at risk of exposure to blood-borne or sexually transmitted diseases: unprotected sex.    Arrests and legal interventions related to substance use: \"I think it is a DUI.\" She was 21 years old. She also has a 5th degree assault charge from age 21.    A description of how the patient's use affected their ability to function appropriately in a work setting: she has been fired before.    A description of how the patient's use affected their ability to function appropriately in an educational setting: \"I went to school every day high. I had to go to an alternative school.\"    Leisure time activities that are associated with substance use: \"typical f**ked up s**t.\"    Do you think your substance use has become a problem for you? She agrees she has a substance abuse problem.    MEDICAL HISTORY  Physical or medical concerns or diagnoses: none reported.    Do you have " any current medical treatment needs not being addressed by inpatient treatment?  no    Do you need a referral for a medical provider? yes    Current medications:   Current Facility-Administered Medications   Medication Dose Route Frequency Provider Last Rate Last Admin    acetaminophen (TYLENOL) tablet 650 mg  650 mg Oral Q4H PRN Tawanda Madlonado APRN CNP        alum & mag hydroxide-simethicone (MAALOX) suspension 30 mL  30 mL Oral Q4H PRN Tawanda Maldonado APRN CNP        cloNIDine (CATAPRES) tablet 0.1 mg  0.1 mg Oral Q6H PRN Crystal Alonzo APRN CNP   0.1 mg at 03/18/25 0927    gabapentin (NEURONTIN) tablet 600 mg  600 mg Oral TID Crystal Alonzo APRN CNP        hydrOXYzine HCl (ATARAX) tablet 25 mg  25 mg Oral Q4H PRN Crystal Alonzo APRN CNP        nicotine (NICODERM CQ) 21 MG/24HR 24 hr patch 1 patch  1 patch Transdermal Daily Crystal Alonzo APRN CNP        nicotine (NICORETTE) gum 2 mg  2 mg Buccal Q1H PRN Tawanda Maldonado APRN CNP   2 mg at 03/16/25 1442    OLANZapine (zyPREXA) tablet 10 mg  10 mg Oral TID PRN Tawanda Maldonado APRN CNP        Or    OLANZapine (zyPREXA) injection 10 mg  10 mg Intramuscular TID PRN Tawanda Maldonado APRN CNP        ramelteon (ROZEREM) tablet 8 mg  8 mg Oral At Bedtime Tawanda Maldonado APRN CNP   8 mg at 03/17/25 1936    senna-docusate (SENOKOT-S/PERICOLACE) 8.6-50 MG per tablet 1 tablet  1 tablet Oral BID PRN Tawanda Maldonado APRN CNP        traZODone (DESYREL) tablet 150 mg  150 mg Oral At Bedtime Tawanda Maldonado APRN CNP   150 mg at 03/17/25 1937    venlafaxine (EFFEXOR XR) 24 hr capsule 150 mg  150 mg Oral Daily with breakfast Tawanda Maldonado APRN CNP   150 mg at 03/18/25 0805       Are you pregnant? No    Do you have any specific physical needs/accommodations? No    MENTAL HEALTH HISTORY:  Have you ever had MH hospitalizations or treatment for mental health illness: Yes. When, Where, and What circumstances: Pt is currently on an IP MH Unit.  "She has been hospitalized 2 other times.    Mental health history, including diagnosis and symptoms, and the effect on the client's ability to function: \"depression, anxiety, panic disorder, insomnia.\"    Current mental health treatment including psychotropic medication needed to maintain stability: (Note: The assessment must utilize screening tools approved by the commissioner pursuant to section 245.4863 to identify whether the client screens positive for co-occurring disorders): She does not have any current providers.  Medication Orders - Psychiatric (From admission, onward)      Start     Dose/Rate Route Frequency Ordered Stop    03/18/25 0930  nicotine (NICODERM CQ) 21 MG/24HR 24 hr patch 1 patch         1 patch  over 24 Hours Transdermal DAILY 03/18/25 0910      03/18/25 0914  hydrOXYzine HCl (ATARAX) tablet 25 mg         25 mg Oral EVERY 4 HOURS PRN 03/18/25 0914      03/17/25 2000  ramelteon (ROZEREM) tablet 8 mg        Note to Pharmacy: PTA Sig:Take 8 mg by mouth at bedtime.      8 mg Oral AT BEDTIME 03/17/25 1116      03/17/25 2000  traZODone (DESYREL) tablet 150 mg        Note to Pharmacy: PTA Sig:Take 1 tablet by mouth at bedtime.      150 mg Oral AT BEDTIME 03/17/25 1116      03/16/25 1431  nicotine (NICORETTE) gum 2 mg         2 mg Buccal EVERY 1 HOUR PRN 03/16/25 1432      03/16/25 0800  venlafaxine (EFFEXOR XR) 24 hr capsule 150 mg         150 mg Oral DAILY WITH BREAKFAST 03/15/25 1742      03/15/25 1742  OLANZapine (zyPREXA) tablet 10 mg        Placed in \"Or\" Linked Group    10 mg Oral 3 TIMES DAILY PRN 03/15/25 1742      03/15/25 1742  OLANZapine (zyPREXA) injection 10 mg        Placed in \"Or\" Linked Group    10 mg Intramuscular 3 TIMES DAILY PRN 03/15/25 1742                GAIN-SS Tool:      3/18/2025    10:00 AM   When was the last time that you had significant problems...   with feeling very trapped, lonely, sad, blue, depressed or hopeless about the future? Past month   with sleep trouble, " "such as bad dreams, sleeping restlessly, or falling asleep during the day? Past Month   with feeling very anxious, nervous, tense, scared, panicked or like something bad was going to happen? Past month   with becoming very distressed & upset when something reminded you of the past? Past month   with thinking about ending your life or committing suicide? Past month         3/18/2025    10:00 AM   When was the last time that you did the following things 2 or more times?   Lied or conned to get things you wanted or to avoid having to do something? Past month   Had a hard time paying attention at school, work or home? Past month   Had a hard time listening to instructions at school, work or home? Past month   Were a bully or threatened other people? 1+ years ago   Started physical fights with other people? 1+ years ago     Have you ever been verbally, emotionally, physically or sexually abused?   No    Family history of substance use and misuse: \"everyone.\"  Family History   Problem Relation Age of Onset    Allergies Sister         possible seafood allergy.    Substance Abuse Maternal Grandmother     Substance Abuse Maternal Grandfather     Substance Abuse Paternal Grandmother     Substance Abuse Paternal Grandfather     Family History Negative No family hx of         bleeding disorders    Asthma No family hx of     C.A.D. No family hx of     Diabetes No family hx of     Hypertension No family hx of     Cerebrovascular Disease No family hx of     Breast Cancer No family hx of     Cancer - colorectal No family hx of     Prostate Cancer No family hx of       The patient's desire for family involvement in the treatment program: no  Level of family support: \"my family is very supportive.\"    Social network in relation to expected support for recovery: She has attended sober support groups before.    Are you currently in a significant relationship? No    Do you have any children (include living " arrangements/custody/contact)?:  no     What is your current living situation? Living arrangements - the patient was living in [place] until a few months ago. She was staying with her parents.     Are you employed/attending school? no    SUMMARY:  Ability to understand written treatment materials: Yes  Ability to understand patient rules and patient rights: Yes  Does the patient recognize needs related to substance use and is willing to follow treatment recommendations: Yes  Does the patient have an opioid use disorder:  does not have a history of opiate use.    ASAM Dimension Scale Ratings:    Dimension 1 -  Acute Intoxication/Withdrawal: 0 - No Problem  Dimension 2 - Biomedical: 0 - No Problem  Dimension 3 - Emotional/Behavioral/Cognitive Conditions: 2 - Moderate Problem  Dimension 4 - Readiness to Change:  2 - Moderate Problem  Dimension 5 - Relapse/Continued Use/ Continued Problem Potential: 4 - Extreme Problem  Dimension 6 - Recovery Environment:  3 - Severe Problem    Category of Substance Severity (ICD-10 Code / DSM 5 Code)     Alcohol Use Disorder Severe  (10.20) (303.90)   Cannabis Use Disorder The patient does not meet the criteria for a Cannabis use disorder.   Hallucinogen Use Disorder The patient does not meet the criteria for a Hallucinogen use disorder.   Inhalant Use Disorder The patient does not meet the criteria for an Inhalant use disorder.   Opioid Use Disorder The patient does not meet the criteria for an Opioid use disorder.   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   Tobacco Use Disorder Moderate   (F17.200) (305.1)   Other (or unknown) Substance Use Disorder The patient does not meet the criteria for a Other (or unknown) Substance use disorder.     A problematic pattern of alcohol/drug use leading to clinically significant impairment or distress, as manifested by  at least two of the following, occurring within a 12-month period:    1.) Alcohol/drug is often taken in larger amounts or over a longer period than was intended.  2.) There is a persistent desire or unsuccessful efforts to cut down or control alcohol/drug use  3.) A great deal of time is spent in activities necessary to obtain alcohol, use alcohol, or recover from its effects.  4.) Craving, or a strong desire or urge to use alcohol/drug  5.) Recurrent alcohol/drug use resulting in a failure to fulfill major role obligations at work, school or home.  6.) Continued alcohol use despite having persistent or recurrent social or interpersonal problems caused or exacerbated by the effects of alcohol/drug.  7.) Important social, occupational, or recreational activities are given up or reduced because of alcohol/drug use.  8.) Recurrent alcohol/drug use in situations in which it is physically hazardous.  9.) Alcohol/drug use is continued despite knowledge of having a persistent or recurrent physical or psychological problem that is likely to have been caused or exacerbated by alcohol.  10.) Tolerance, as defined by either of the following: A need for markedly increased amounts of alcohol/drug to achieve intoxication or desired effect.  11.) Withdrawal, as manifested by either of the following: The characteristic withdrawal syndrome for alcohol/drug (refer to Criteria A and B of the criteria set for alcohol/drug withdrawal).    Specify if: In early remission:  After full criteria for alcohol/drug use disorder were previously met, none of the criteria for alcohol/drug use disorder have been met for at least 3 months but for less than 12 months (with the exception that Criterion A4,  Craving or a strong desire or urge to use alcohol/drug  may be met).     In sustained remission:   After full criteria for alcohol use disorder were previously met, non of the criteria for alcohol/drug use disorder have been met at any time during  a period of 12 months or longer (with the exception that Criterion A4,  Craving or strong desire or urge to use alcohol/drug  may be met).     Specify if:   This additional specifier is used if the individual is in an environment where access to alcohol is restricted.    Mild: Presence of 2-3 symptoms  Moderate: Presence of 4-5 symptoms  Severe: Presence of 6 or more symptoms    Collateral information:   The patient's medical record at [facility] was reviewed and the information contained in the medical record supported the patient's account of her chemical use history and chemical use consequences.    Recommendations:   1)  Complete an IOP with Lodging GIANA program.  2)  Abstain from all mood-altering chemicals unless prescribed by a licensed provider.   3)  Attend, at minimum, 2 weekly support group meetings, such as 12 step based (AA/NA), SMART Recovery, Health Realizations, and/or Refuge Recovery meetings.     4)  Actively work with a female mentor/sponsor on a weekly basis.   5)  Follow all the recommendations of your treatment/medical providers.    Clinical Substantiation:    Pt has a long history of alcohol and benzodiazepine usage. She reports being sober for 18 months before relapsing. She has minimal sober coping skills.    Referrals/ Alternatives:  [facility]:  [address] [place] [address]  Phone: [phone]  Fax: [phone]  Admission Specialist: Ashley Gilbert  Phone: [phone]  email: [email or web address]  [email or web address]  Groups:  1) Morning IOP + Mother's Group: Mon-Thurs 9am-noon  2) Morning In Person Group: Mon-Thurs 9am-noon  3) Morning Telehealth Group: Mon-Thurs 9am-noon  4) Afternoon Telehealth Group: Mon-Thurs 1-3pm  5) Evening Group: Mon-Thurs 5-8pm  IOP with Loging:   -32 beds    Transitions: (IOP with lodging- [facility])  [address] [place] [address]  Phone: [phone]  Fax: [phone]  email:  [email or web address]  [email or web address]  *Group Monday through Friday 8:00am to 12:00pm    [facility]: IOP w/ Lodging  Phone: [phone]  Email: [email or web address]   *Program length is 3-6 months & housing is provided (no kids)  *18 hours of programming each week (Monday-Wednesday, 8 am - 2 pm)  *Transportation is provided from housing to programming  *Programming will take place in-person at [facility], [address] [place] [address], [address], [place], [place]  *Includes weekly individual counseling session    [facility] IOP w/Lodging  [address] [place] [address]  Phone: [phone]  Fax: [phone]   Email: [email or web address]  [email or web address]    GIANA consult completed by:   Nyasia Nolan [facility]    GIANA Evaluation Counselor  Email: [email or web address] ; Phone: [phone]  [facility] Evaluation Department  [address] [place] [address]     *Due to regulation of Title 42 of the Code of Federal Regulations (CFR) Part 2: Confidentiality laws apply to this note and the information wherein.  Thus, this note cannot be copy and pasted into any other health care staff's note nor can it be included in general medical records sent to ANY outside agency without the patient's written consent.

## 2025-03-18 NOTE — CONSULTS
3/18/2025  Pt completed her GIANA CA today. She is willing to attend an IOP with Lodging program. I emailed the below list to pt's CTC to share with pt.    Next Steps:  1) pt picks a tx program.  2) pt informs me or the unit sw which tx program he wants to go to.  3) pt signs CHIDI for tx program.  4) either myself or unit SW sends GIANA CA to tx program.    [facility]  Service Initiation Date ID: [id number]    Recommendations:   1)  Complete an IOP with Lodging GIANA program.  2)  Abstain from all mood-altering chemicals unless prescribed by a licensed provider.   3)  Attend, at minimum, 2 weekly support group meetings, such as 12 step based (AA/NA), SMART Recovery, Health Realizations, and/or Refuge Recovery meetings.     4)  Actively work with a female mentor/sponsor on a weekly basis.   5)  Follow all the recommendations of your treatment/medical providers.    Clinical Substantiation:    Pt has a long history of alcohol and benzodiazepine usage. She reports being sober for 18 months before relapsing. She has minimal sober coping skills.    Referrals/ Alternatives:  [facility]:  [address] [place] [address]  Phone: [phone]  Fax: [phone]  Admission Specialist: Ashley Gilbert  Phone: [phone]  email: [email or web address]  [email or web address]  Groups:  1) Morning IOP + Mother's Group: Mon-Thurs 9am-noon  2) Morning In Person Group: Mon-Thurs 9am-noon  3) Morning Telehealth Group: Mon-Thurs 9am-noon  4) Afternoon Telehealth Group: Mon-Thurs 1-3pm  5) Evening Group: Mon-Thurs 5-8pm  IOP with Loging:   -32 beds    Transitions: (IOP with lodging- Men & Women)  [address] [place] [address]  Phone: [phone]  Fax: [phone]  email: [email or web address]  [email or web address]  *Group Monday through Friday 8:00am to 12:00pm    [place]: IOP w/ Lodging  Phone: [phone]  Email: [email or web address]   *Program length is 3-6 months & housing is provided (no kids)  *18 hours of  programming each week (Monday-Wednesday, 8 am - 2 pm)  *Transportation is provided from housing to programming  *Programming will take place in-person at [facility], [address] [place] [address] [address], [address], [place], [place]  *Includes weekly individual counseling session    [facility] w/Lodging  [address] [place] [address]  Phone: [phone]  Fax: [phone]   Email: [email or web address]  [email or web address]    GIANA consult completed by:   Nyasia Nolan [facility]    GIANA Evaluation Counselor  Email: [email or web address] ; Phone: [phone]  [facility] Mental Health and Addiction Services Evaluation Department  [address] [place] [address]     *Due to regulation of Title 42 of the Code of Federal Regulations (CFR) Part 2: Confidentiality laws apply to this note and the information wherein.  Thus, this note cannot be copy and pasted into any other health care staff's note nor can it be included in general medical records sent to ANY outside agency without the patient's written consent.

## 2025-03-18 NOTE — PLAN OF CARE
Team Note Due:  Monday  Assessment/Intervention/Current Symptoms and Care Coordination  Chart review and met with team, discussed pt progress, symptomology, and response to treatment.  Discussed the discharge plan and any potential impediments to discharge.    CTC received a list of possible treatment options for pt. CTC met with pt and provided this list along with CHIDI's to sign for referrals. [facility] also provided pt with CHIDI to sign for her mother so that she could get a copy of the possible treatment options.      CTC informed CD  that CHIDI was signed and email was sent to pt's mother with treatment options.     Discharge Plan or Goal  Pending stabilization & development of a safe discharge plan.    Dispo Plan: GIANA programing   Discharge Date/ time:  Transportation:   AVS Completed: Yes [] No[x]  Provisional Discharge: Yes[] No[x]    Barriers to Discharge   Patient requires further psychiatric stabilization due to current symptomology    Referral Status  Substance Use Programming    Legal Status  Patient is voluntary        Contacts:   CHIDI signed for Pt's mom on 3/18/25   Abigail Rosas [phone]      Upcoming Meetings and Dates/Important Information and next steps:  CTC to work with CD  for referrals.

## 2025-03-18 NOTE — PLAN OF CARE
Group Attendance:  attended full group    Time session began: 1315  Time session ended: 1415  Patient's total time in group: 50    Total # Attendees   6   Group Type life skills     Group Topic Covered relationships     Group Session Detail Group  focus was structured socialization with the game Life Stories. Patient got to know each other by taking turns answering questions about their lives in varius categories.      Patient's response to the group topic/interactions:  positive affect, cooperative with task, organized, socially appropriate, listened actively, and worked intermittently     Patient Details: Patient was calm and cooperative and had a bright affect. She fully engaged in the activity and answered questions briefly.           01833 - Group psychotherapy - 1 Session  Patient Active Problem List   Diagnosis    IUD (intrauterine device) in place    Depression    MARIVEL (generalized anxiety disorder)    Polysubstance abuse (H)    Moderate major depression (H)    Nasal obstruction    Closed fracture of nasal bone, initial encounter    Deviated nasal septum    Hypertrophy of both inferior nasal turbinates    History of fracture of nasal bone    Acquired nasal deformity    Severe episode of recurrent major depressive disorder, without psychotic features (H)    Generalized anxiety disorder    Intentional benzodiazepine overdose (H)    Suicide attempt (H)

## 2025-03-19 PROCEDURE — 250N000013 HC RX MED GY IP 250 OP 250 PS 637: Performed by: NURSE PRACTITIONER

## 2025-03-19 PROCEDURE — 128N000002 HC R&B CD/MH ADOLESCENT

## 2025-03-19 PROCEDURE — 99232 SBSQ HOSP IP/OBS MODERATE 35: CPT | Performed by: CLINICAL NURSE SPECIALIST

## 2025-03-19 PROCEDURE — 250N000013 HC RX MED GY IP 250 OP 250 PS 637: Performed by: REGISTERED NURSE

## 2025-03-19 PROCEDURE — 97150 GROUP THERAPEUTIC PROCEDURES: CPT | Mod: GO

## 2025-03-19 PROCEDURE — 90832 PSYTX W PT 30 MINUTES: CPT | Performed by: PSYCHOLOGIST

## 2025-03-19 RX ADMIN — VENLAFAXINE HYDROCHLORIDE 150 MG: 150 CAPSULE, EXTENDED RELEASE ORAL at 09:07

## 2025-03-19 RX ADMIN — GABAPENTIN 600 MG: 600 TABLET, FILM COATED ORAL at 09:07

## 2025-03-19 RX ADMIN — RAMELTEON 8 MG: 8 TABLET, FILM COATED ORAL at 19:47

## 2025-03-19 RX ADMIN — NICOTINE POLACRILEX 2 MG: 2 GUM, CHEWING BUCCAL at 15:51

## 2025-03-19 RX ADMIN — GABAPENTIN 600 MG: 600 TABLET, FILM COATED ORAL at 19:47

## 2025-03-19 RX ADMIN — NICOTINE POLACRILEX 2 MG: 2 GUM, CHEWING BUCCAL at 19:47

## 2025-03-19 RX ADMIN — TRAZODONE HYDROCHLORIDE 150 MG: 100 TABLET ORAL at 19:47

## 2025-03-19 RX ADMIN — NICOTINE 1 PATCH: 21 PATCH, EXTENDED RELEASE TRANSDERMAL at 09:09

## 2025-03-19 RX ADMIN — NICOTINE POLACRILEX 2 MG: 2 GUM, CHEWING BUCCAL at 10:12

## 2025-03-19 RX ADMIN — CLONIDINE HYDROCHLORIDE 0.1 MG: 0.1 TABLET ORAL at 12:50

## 2025-03-19 RX ADMIN — NICOTINE POLACRILEX 2 MG: 2 GUM, CHEWING BUCCAL at 11:11

## 2025-03-19 RX ADMIN — GABAPENTIN 600 MG: 600 TABLET, FILM COATED ORAL at 13:12

## 2025-03-19 RX ADMIN — HYDROXYZINE HYDROCHLORIDE 25 MG: 25 TABLET, FILM COATED ORAL at 11:11

## 2025-03-19 ASSESSMENT — ACTIVITIES OF DAILY LIVING (ADL)
ADLS_ACUITY_SCORE: 20

## 2025-03-19 NOTE — PLAN OF CARE
Team Note Due:  Monday  Assessment/Intervention/Current Symptoms and Care Coordination  Chart review and met with team, discussed pt progress, symptomology, and response to treatment.  Discussed the discharge plan and any potential impediments to discharge.    CTC met with pt to discuss discharge. Pt signed CHIDI's for GIANA referrals.     Discharge Plan or Goal  Pending stabilization & development of a safe discharge plan.    Dispo Plan: GIANA programing   Discharge Date/ time:  Transportation:   AVS Completed: Yes [] No[x]  Provisional Discharge: Yes[] No[x]    Barriers to Discharge   Patient requires further psychiatric stabilization due to current symptomology    Referral Status  Substance Use Programming    Legal Status  Patient is voluntary        Contacts:   CHIDI signed for Pt's mom on 3/18/25   Abigail Rosas [phone]      Upcoming Meetings and Dates/Important Information and next steps:  CTC to work with CD  for referrals.

## 2025-03-19 NOTE — PLAN OF CARE
"Individual Therapy Note      Date of Service: March 19, 2025    Patient: Lamar goes by \"Lamar,\" uses she/her pronouns    Individuals Present: [facility]    Session start: 1320  Session end: 1350  Session duration in minutes: 30      Modality Used: Rapport Building, Motivational Interviewing, and Solution Focused    Goals: Verbal processing and safety planning    Patient Description of current symptoms: sad     Mental Status Exam:   Attitude: cooperative  Eye Contact: good  Mood: angry and sad   Affect: appropriate and in normal range  Speech: clear, coherent  Psychomotor Behavior: no evidence of tardive dyskinesia, dystonia, or tics  Thought Process:  logical  Associations: no loose associations  Thought Content: no evidence of suicidal ideation or homicidal ideation  Insight: fair  Judgement: intact  Attention Span and Concentration: intact    Pt progress: Patient was tearful about discharge. She thought she was leaving today and learned that she needs to go to treatment door to door. We processed her feelings and completed a safety plan. Patient shared that she has no friends now. She shared that she needs to keep busy. Writer suggested getting a job. Patient agreed but shared that she doesn't have a 's license. She lost it at age 21 we she got a DUI. We discussed alternate ways to get to work. Writer inquired if she is financially supported by her parents. She reported she gets money from a sugar daddy with no strings attached. Patient also reported that she has fake confidence and masks very well. She struggles with self hate. We discussed positive affirmation and how she can reframe her thinking about herself.    Treatment Objective(s) Addressed:   The focus of this session was on rapport building, identifying and practicing coping strategies, processing feelings related to remaining on the unit, and safety planning     Progress Towards Goals and Assessment of Patient:   Patient is making " progress towards treatment goals as evidenced by engaging in treatment and the milieu.       Therapeutic Intervention(s):   Provided active listening, unconditional positive regard, and validation.   Engaged in safety planning identifying coping skills, warning signs, health support and resources, Understand and identify reasons for hospitalization, how to use the hospital to create change and prevent future hospitalizations , Identified and practiced coping skills, and Engaged in guided discovery, explored patient's perspectives and helped expand them through socratic dialogue      Safety Plan: completed with patient    Plan/next step: Meet as needed    96083 - Psychotherapy (with patient) - 30 (16-37*) min    Patient Active Problem List   Diagnosis    IUD (intrauterine device) in place    Depression    MARIVEL (generalized anxiety disorder)    Polysubstance abuse (H)    Moderate major depression (H)    Nasal obstruction    Closed fracture of nasal bone, initial encounter    Deviated nasal septum    Hypertrophy of both inferior nasal turbinates    History of fracture of nasal bone    Acquired nasal deformity    Severe episode of recurrent major depressive disorder, without psychotic features (H)    Generalized anxiety disorder    Intentional benzodiazepine overdose (H)    Suicide attempt (H)

## 2025-03-19 NOTE — PLAN OF CARE
Rehab Group    Start time: 1115  End time: 1200  Patient time total: 45 minutes    attended full group    #8 attended   Group Type: occupational therapy   Group Topic Covered: balanced lifestyle, cognitive activities, coping skills, healthy leisure time, problem solving, and social skills       Group Session Detail:  OT CLINIC     Patient Response/Contribution:  cooperative with task, organized, socially appropriate, attentive, and actively engaged       Patient Detail:    Occupational therapy clinic to facilitate coping skill exploration, creative expression within personally meaningful activities, and clinical observation of social, cognitive, and kinesthetic performance skills. Pt response: Pt presented with calm mood, brighter affect. IND to initiate, gather materials, sequence, and adjust to workspace demands as needed to continue working on her guided watercolor task. IND With all performance skills. Demonstrated good focus. Appeared comfortable interacting with peers and staff.       [id number] OT Group (2 or more in attendance)      Patient Active Problem List   Diagnosis    IUD (intrauterine device) in place    Depression    MARIVEL (generalized anxiety disorder)    Polysubstance abuse (H)    Moderate major depression (H)    Nasal obstruction    Closed fracture of nasal bone, initial encounter    Deviated nasal septum    Hypertrophy of both inferior nasal turbinates    History of fracture of nasal bone    Acquired nasal deformity    Severe episode of recurrent major depressive disorder, without psychotic features (H)    Generalized anxiety disorder    Intentional benzodiazepine overdose (H)    Suicide attempt (H)

## 2025-03-19 NOTE — PLAN OF CARE
Goal Outcome Evaluation:      No sleep disturbance noted. patient appears a sleep during checks.pt slept for  7 hours last night. pt on fall, SI/SIB, sexual and seizure precaution. no occurance of such behaviors and no behavioral or safty concern , no report/ indication of pain or discomfort noted. Safety checks in place. Will continue to monitor and offer  support as needed.

## 2025-03-19 NOTE — PROGRESS NOTES
Rehab Group    Start time: 2030  End time: 2120  Patient time total: 45 minutes    attended full group    #4 attended   Group Type: recreation   Group Topic Covered: activity therapy, healthy leisure time, and social skills       Group Session Detail:  TR leisure group     Patient Response/Contribution:  cooperative with task, attentive, and actively engaged       Patient Detail:    Pt attended the structured Therapeutic Recreation group, participating in a group activity. Pt participated in a leisure activity with social engagement to gain self-esteem, manage behaviors, improve social skills, decrease isolation, and reduce anxiety/depression.   Pt remained focused and engaged throughout group activity.  Pt was appropriate with interactions with the others in group. Pt presented with a flat affect, but brightened some with participation in the activity. Pt was an active participant, contributing to the clues and descriptions throughout the activity.       Activity Therapy Per 15 min ()      Patient Active Problem List   Diagnosis    IUD (intrauterine device) in place    Depression    MARIVEL (generalized anxiety disorder)    Polysubstance abuse (H)    Moderate major depression (H)    Nasal obstruction    Closed fracture of nasal bone, initial encounter    Deviated nasal septum    Hypertrophy of both inferior nasal turbinates    History of fracture of nasal bone    Acquired nasal deformity    Severe episode of recurrent major depressive disorder, without psychotic features (H)    Generalized anxiety disorder    Intentional benzodiazepine overdose (H)    Suicide attempt (H)

## 2025-03-19 NOTE — PLAN OF CARE
Rehab Group    Start time: 1015  End time: 1100  Patient time total: 45 minutes    attended full group    #6 attended   Group Type: occupational therapy   Group Topic Covered: balanced lifestyle, coping skills, and mindfulness       Group Session Detail:  Mental health management     Patient Response/Contribution:  cooperative with task, organized, expressed understanding of topic, and actively engaged       Patient Detail:    Mental health management group focused on developing insight into supports, needs, and life struggles via a calming guided watercolor and safe containment. Pt Response:   Pt demonstrated basic understanding of the activity based on pts observation and engagement in initial discussion. Pt at times semi-sarcastic when engaging in the activity which appeared to be fueled peer interaction.         [id number] OT Group (2 or more in attendance)      Patient Active Problem List   Diagnosis    IUD (intrauterine device) in place    Depression    MARIVEL (generalized anxiety disorder)    Polysubstance abuse (H)    Moderate major depression (H)    Nasal obstruction    Closed fracture of nasal bone, initial encounter    Deviated nasal septum    Hypertrophy of both inferior nasal turbinates    History of fracture of nasal bone    Acquired nasal deformity    Severe episode of recurrent major depressive disorder, without psychotic features (H)    Generalized anxiety disorder    Intentional benzodiazepine overdose (H)    Suicide attempt (H)

## 2025-03-19 NOTE — PLAN OF CARE
"  Problem: Adult Behavioral Health Plan of Care  Goal: Plan of Care Review  Outcome: Progressing   Goal Outcome Evaluation:    Patient up for morning routine.  Mid-range affect.  Her first comment to this writer was \"what do I need to be discharged?\"    Denies SI/SIB or thoughts to hurt others.  Currently rates depression as 3, and anxiety as 6.  Denies hallucinations   Thinking is linear and organized.    Wants discharge--somewhat hyperverbal when talking about her perception of how she wants her discharge look like--states she want to go to [place] this weekend to clean out her house with the assistance of her father. Then come back to [place] to go to treatment.  Worried about insurance issues.  Wants to know about discharge right away--advised to exercise patience.    1130: has been calm.    1300:tearful because will not be discharged today.  Team is recommending door to door.  "

## 2025-03-19 NOTE — PROGRESS NOTES
[facility], [place]   Psychiatric Progress Note        Interim History:   The patient's care was discussed with the treatment team during the daily team meeting and/or staff's chart notes were reviewed.  Staff report patient is taking hr medications.     Psychiatric symptoms and interventions:     Patient was tearful and asking to discharge. Patient completed CD assessment but referrals were not made until today. Provider discussed going door to door with mother. Mother thinks patient can be safe for a short period of time but has concerns about relapse. Mother is concerned that patient accesses benzodiazepines on the internet and puts herself at risk.     Care conference is scheduled for Thursday at noon. To discuss discharge planning.     Provider discussed with patient who reluctantly agreed. Provider discussed setting patient up for success. Patient has as history of 3 hospitalizations  involving drug use.           Medications:     Current Facility-Administered Medications   Medication Dose Route Frequency Provider Last Rate Last Admin    gabapentin (NEURONTIN) tablet 600 mg  600 mg Oral TID Crystal Alonzo APRN CNP   600 mg at 03/19/25 0907    nicotine (NICODERM CQ) 21 MG/24HR 24 hr patch 1 patch  1 patch Transdermal Daily Crystal Alonzo APRN CNP   1 patch at 03/19/25 0909    ramelteon (ROZEREM) tablet 8 mg  8 mg Oral At Bedtime Tawanda Maldonado APRN CNP   8 mg at 03/18/25 2217    traZODone (DESYREL) tablet 150 mg  150 mg Oral At Bedtime Tawanda Maldonado APRN CNP   150 mg at 03/18/25 2217    venlafaxine (EFFEXOR XR) 24 hr capsule 150 mg  150 mg Oral Daily with breakfast Tawanda Maldonado APRN CNP   150 mg at 03/19/25 0907          Allergies:     Allergies   Allergen Reactions    Seasonal Allergies           Labs:   No results found for this or any previous visit (from the past 24 hours).       Psychiatric Examination:     /68   Pulse 91   Temp 98.2  F (36.8   C) (Temporal)   Resp 16   Wt 59.1 kg (130 lb 6.4 oz)   SpO2 98%   BMI 20.42 kg/m    Weight is 130 lbs 6.4 oz  Body mass index is 20.42 kg/m .  Orthostatic Vitals       None              Appearance: awake, alert and adequately groomed  Attitude:  guarded  Eye Contact:  fair  Mood:  anxious  Affect:  intensity is heightened  Speech:  pressured speech  Psychomotor Behavior:  no evidence of tardive dyskinesia, dystonia, or tics  Throught Process:  goal oriented  Associations:  no loose associations  Thought Content:  no evidence of suicidal ideation or homicidal ideation, no auditory hallucinations present, and no visual hallucinations present  Insight:  fair  Judgement:  fair  Oriented to:  time, person, and place  Attention Span and Concentration:  intact  Recent and Remote Memory:  intact    Clinical Global Impressions  First:     Most recent:            Precautions:     Behavioral Orders   Procedures    Code 1 - Restrict to Unit    Routine Programming     As clinically indicated    Self Injury Precaution    Status 15     Every 15 minutes.    Suicide precautions: Suicide Risk: MODERATE; Clinical rationale to override score: lack of access to a plan for self-harm     Patients on Suicide Precautions should have a Combination Diet ordered that includes a Diet selection(s) AND a Behavioral Tray selection for Safe Tray - with utensils, or Safe Tray - NO utensils       Order Specific Question:   Suicide Risk     Answer:   MODERATE     Order Specific Question:   Clinical rationale to override score:     Answer:   lack of access to a plan for self-harm          DIagnoses:   Major depressive disorder, recurrent, moderate  Generalized anxiety disorder  Benzodiazepine use disorder  Alcohol use disorder  Nicotine use disorder  Benzodiazepine overdose 3/14/2025         Plan:   Legal status: Voluntary. Patient is not safe for discharge until discharge plan has been made.     Medication management:   Venlafaxine  mg  daily  Gabapentin increase to 600 mg TID (PTA dose 800 mg TID)  Trazodone 150 mg for sleep  Rozerem 8 mg for sleep   PRNs: Clonidine for anxiety (first line treatment), Hydroxyzine for anxiety, Olanzapine for agitation     Medical: UTOX positive for benzodiazepines and amphetamines.     Behavioral/psychology/social:   CD assessment completed 3/18.  She was referred to [facility] with lodging, [facility] with lodging and [facility].     Disposition:   Reason for continued hospitalization: patient is at high risk for relapse and self harm. She is not safe for discharge.   Provider consulted with [facility] regarding discharge planning.   Discharge medications: not ordered   Discharge plan: MICD programming.

## 2025-03-20 VITALS
RESPIRATION RATE: 18 BRPM | OXYGEN SATURATION: 95 % | BODY MASS INDEX: 20.33 KG/M2 | WEIGHT: 129.8 LBS | HEART RATE: 71 BPM | SYSTOLIC BLOOD PRESSURE: 104 MMHG | DIASTOLIC BLOOD PRESSURE: 67 MMHG | TEMPERATURE: 97.5 F

## 2025-03-20 PROCEDURE — 250N000013 HC RX MED GY IP 250 OP 250 PS 637: Performed by: REGISTERED NURSE

## 2025-03-20 PROCEDURE — 99239 HOSP IP/OBS DSCHRG MGMT >30: CPT | Performed by: CLINICAL NURSE SPECIALIST

## 2025-03-20 PROCEDURE — 250N000013 HC RX MED GY IP 250 OP 250 PS 637: Performed by: NURSE PRACTITIONER

## 2025-03-20 PROCEDURE — H2032 ACTIVITY THERAPY, PER 15 MIN: HCPCS

## 2025-03-20 PROCEDURE — 97150 GROUP THERAPEUTIC PROCEDURES: CPT | Mod: GO

## 2025-03-20 RX ORDER — GABAPENTIN 600 MG/1
600 TABLET ORAL 3 TIMES DAILY
Qty: 90 TABLET | Refills: 0 | Status: SHIPPED | OUTPATIENT
Start: 2025-03-20

## 2025-03-20 RX ADMIN — NICOTINE POLACRILEX 2 MG: 2 GUM, CHEWING BUCCAL at 09:51

## 2025-03-20 RX ADMIN — VENLAFAXINE HYDROCHLORIDE 150 MG: 150 CAPSULE, EXTENDED RELEASE ORAL at 08:06

## 2025-03-20 RX ADMIN — NICOTINE POLACRILEX 2 MG: 2 GUM, CHEWING BUCCAL at 11:09

## 2025-03-20 RX ADMIN — GABAPENTIN 600 MG: 600 TABLET, FILM COATED ORAL at 13:41

## 2025-03-20 RX ADMIN — NICOTINE 1 PATCH: 21 PATCH, EXTENDED RELEASE TRANSDERMAL at 08:09

## 2025-03-20 RX ADMIN — HYDROXYZINE HYDROCHLORIDE 25 MG: 25 TABLET, FILM COATED ORAL at 11:58

## 2025-03-20 RX ADMIN — GABAPENTIN 600 MG: 600 TABLET, FILM COATED ORAL at 08:06

## 2025-03-20 ASSESSMENT — ACTIVITIES OF DAILY LIVING (ADL)
ORAL_HYGIENE: INDEPENDENT
ADLS_ACUITY_SCORE: 20
DRESS: INDEPENDENT
ADLS_ACUITY_SCORE: 20
HYGIENE/GROOMING: INDEPENDENT
ADLS_ACUITY_SCORE: 20

## 2025-03-20 NOTE — DISCHARGE SUMMARY
"Psychiatric Discharge Summary    Lamar Rosas MRN# [id number]   Age: 24 year old YOB: 2000     Date of Admission:  3/15/2025  Date of Discharge:  3/20/2025  Admitting Physician:  Larry Pineda MD  Discharge Physician:  Debra A. Naegele, APRN CNS (Contact: [phone])         Event Leading to Hospitalization:   This patient is a 24 year old  female with a significant past psychiatric history of  depression, anxiety, and substance use  who presented to ED after recent overdose. She reports she was at her sober home and did not have anyone to hang out with and then used heroin. She currently is not the best historian and is very fatigued. She has SIO 1:1 for safety for falls precautions. She has been mostly sleeping in bed since arriving to unit. She is still disoriented and confused, not giving much information when asked questions. She has been eating. She endorses recent feelings of depression and endorses anxiety. She denies cravings today and reports goal of wanting to get sober again and \"be happy\". Provider discussed medications and reducing current gabapentin dose due to her lethargy.     Per ED note on 3/14/25:  Lamar Rosas is a 24 year old female, past medical history is significant for depression, polysubstance abuse, generalized anxiety disorder, depression, emergency department concerns of benzodiazepine overdose; clonazepam (some of her own recently prescribed a week ago and some from the \"dark web\") and Xanax(off the dark web as well as mom states she has never had Xanax prescribed to her).  Mom states that the patient was recently at [place] for benzodiazepine abuse about a week ago.  Mom states that she is taken up with some unsavory character who likely facilitated her access to additional benzodiazepines without prescription.  Ingestion was approximately an hour or 2 ago they are not really sure.  They do not know how much she took.  They do not think that she has been " "drinking alcohol as well but I note upon reviewing the EHR that she has a history for alcohol intoxication acutely in 11/28/2024, 11/10/2024 within our system.  When I asked the patient why she took Xanax and clonazepam she shook her head that this was not a suicide attempt.  Mom expressed considerable frustration that they have been unable to get her the help that they need with mental illness.             Per DEC note:  Referral Data and Chief Complaint  Lamar Rosas presents to the ED with family/friends (pt's mom brought pt to ED). Patient is presenting to the ED for the following concerns: Other (see comment), Anxiety, Depression, Substance use (drug benzodiazepine overdose). Factors that make the mental health crisis life threatening or complex are: Pt arrived at the ED after overdosing and ingesting 20 plus xanax and other possible \"street drug\" pills yesterday and later on again yesterday evening. Pt denied having SI intent when interviewed. Collateral information was provided from pt's mother who reported that pt admitted to being suicidal and having suicidal intent when taking the pills. Pt's mother is a pharmacy tech and expressed further concerns after finding several pills in pt's purse that consisted of a combination of prescribed and street pills, some that she felt could likely be laced with fentanyl.  Pt has a hx of anxiety, depression and polysubstance abuse was recently attending treatment for benzodiazepine abuse. Pt provided minimal constricted information and her speech was difficult to understand during the interview, but was able to report of having increased depression and anxiety symptoms. Pt reported that she felt she was discharged from treatment too early. Pt has a hx of previous overdose attempts and hospitalizations..     History of the Crisis   Pt has a hx of being diagnosed with MDD, MARIVEL and polysubstance abuse. Pt was sober for a year and half prior to relapsing late last year. Pt " has been to two treatment programs since then and continues to struggle with worsening depression and anxiety. Pt has a hx of multiple overdose attempts and was hospitalized in [place] two years ago due to overdose. Pt's mother provided further collateral and reported that she feels pt's symptoms are at their worst and is concerned that she wont be alive and will go back to using drugs-overdosing if discharged without mental health treatment.       See Admission note by Tawanda Maldonado APRN CNP  on 3/16/2025  7:00 AM  for additional details.          DIagnoses:     Major depressive disorder, recurrent, moderate  Generalized anxiety disorder  Benzodiazepine use disorder  Alcohol use disorder  Nicotine use disorder  Benzodiazepine overdose 3/14/2025  Clinically Significant Risk Factors                                  # Financial/Environmental Concerns:                 Labs:      Latest Reference Range & Units 03/14/25 22:26   Sodium 135 - 145 mmol/L 138   Potassium 3.4 - 5.3 mmol/L 3.7   Chloride 98 - 107 mmol/L 102   Carbon Dioxide (CO2) 22 - 29 mmol/L 23   Urea Nitrogen 6.0 - 20.0 mg/dL 11.3   Creatinine 0.51 - 0.95 mg/dL 1.02 (H)   GFR Estimate >60 mL/min/1.73m2 79   Calcium 8.8 - 10.4 mg/dL 9.5   Anion Gap 7 - 15 mmol/L 13   Albumin 3.5 - 5.2 g/dL  3.5 - 5.2 g/dL 4.6  4.6   Protein Total 6.4 - 8.3 g/dL  6.4 - 8.3 g/dL 6.9  6.9   Alkaline Phosphatase 40 - 150 U/L  40 - 150 U/L 86  86   ALT 0 - 50 U/L  0 - 50 U/L 14  14   AST 0 - 45 U/L  0 - 45 U/L 19  19   Bilirubin Direct 0.00 - 0.30 mg/dL 0.14   Bilirubin Total <=1.2 mg/dL  <=1.2 mg/dL 0.5  0.5   (H): Data is abnormally high   Latest Reference Range & Units 03/14/25 22:26   Glucose 70 - 99 mg/dL 75   HCG Qualitative Serum Negative  Negative   Lipase 13 - 60 U/L 35   FIO2  21   Ph Venous 7.32 - 7.43  7.40   PCO2 Venous 40 - 50 mm Hg 42   PO2 Venous 25 - 47 mm Hg 52 (H)   O2 Sat, Venous 70.0 - 75.0 % 87.4 (H)   Bicarbonate Venous 21 - 28 mmol/L 26   Base Excess Venous  -3.0 - 3.0 mmol/L 1.2   Oxyhemoglobin Venous 70 - 75 % 86 (H)   WBC 4.0 - 11.0 10e3/uL 6.6   Hemoglobin 11.7 - 15.7 g/dL 13.9   Hematocrit 35.0 - 47.0 % 38.4   Platelet Count 150 - 450 10e3/uL 178   RBC Count 3.80 - 5.20 10e6/uL 4.42   MCV 78 - 100 fL 87   MCH 26.5 - 33.0 pg 31.4   MCHC 31.5 - 36.5 g/dL 36.2   RDW 10.0 - 15.0 % 10.9   % Neutrophils % 56   (H): Data is abnormally high   Latest Reference Range & Units 03/14/25 22:26   % Lymphocytes % 33   % Monocytes % 9   % Eosinophils % 1   % Basophils % 0   Absolute Basophils 0.0 - 0.2 10e3/uL 0.0   Absolute Eosinophils 0.0 - 0.7 10e3/uL 0.1   Absolute Immature Granulocytes <=0.4 10e3/uL 0.0   Absolute Lymphocytes 0.8 - 5.3 10e3/uL 2.2   Absolute Monocytes 0.0 - 1.3 10e3/uL 0.6   % Immature Granulocytes % 0   Absolute Neutrophils 1.6 - 8.3 10e3/uL 3.7   Absolute NRBCs 10e3/uL 0.0   NRBCs per 100 WBC <1 /100 0      Latest Reference Range & Units 03/14/25 22:44   Amphetamine Qual Urine Screen Negative  Screen Positive !   Fentanyl Qual Urine Screen Negative  Screen Negative   Cocaine Urine Screen Negative  Screen Negative   Benzodiazepine Urine Screen Negative  Screen Positive !   Opiates Qualitative Urine Screen Negative  Screen Negative   PCP Urine Screen Negative  Screen Negative   Cannabinoids Qual Urine Screen Negative  Screen Negative   Barbiturates Qual Urine Screen Negative  Screen Negative   !: Data is abnormal       Consults:   Consultation during this admission received from chemical dependency assessment.          Hospital Course:   Lamar Rosas was admitted to Station 6[facility] with attending Estephania Magana MD as a voluntary patient. The patient was placed under status 15 (15 minute checks) to ensure patient safety.     Medication management:   Venlafaxine  mg daily  Gabapentin increase to 600 mg TID (PTA dose 800 mg TID)  Trazodone 150 mg for sleep  Rozerem 8 mg for sleep     Pt completed CD assessment. Patient will follow up with referrals.  Provider, CTC, patient and mother had care conference piror to discharge. Patient said she would follow up with referrals . Patient if going to [place] this weekend to manage a house she was living in. She will return to [place] on Sunday and wants to start treatment ASAP.     Provider educated patient on the detrimental effects of illicit substances on her mood and possible adverse side effects with prescribed medications. Recommendation is to abstain from all illicit substances       Lamar Rosas did participate in groups and was visible in the milieu.     The patient's symptoms of suicidal ideation resolved.  Patient reports improved mood  and denies suicidal thinking. Patient will follow  up with CD referrals for treatment. Patient completed safety plan piror to discharge.     Lamar Rosas was released to home into mother's care. At the time of discharge Lamar Rosas was determined to not be a danger to herself or others.          Discharge Medications:     Current Discharge Medication List        CONTINUE these medications which have CHANGED    Details   gabapentin (NEURONTIN) 600 MG tablet Take 1 tablet (600 mg) by mouth 3 times daily.  Qty: 90 tablet, Refills: 0    Associated Diagnoses: MARIVEL (generalized anxiety disorder)           CONTINUE these medications which have NOT CHANGED    Details   hydrOXYzine HCl (ATARAX) 25 MG tablet Take 25 mg by mouth 4 times daily as needed for anxiety.      nicotine (NICODERM CQ) 21 MG/24HR 24 hr patch Place 1 patch onto the skin every 24 hours.      ramelteon (ROZEREM) 8 MG tablet Take 8 mg by mouth at bedtime.      traZODone (DESYREL) 150 MG tablet Take 1 tablet by mouth at bedtime.      venlafaxine (EFFEXOR-XR) 150 MG 24 hr capsule TAKE 1 CAPSULE BY MOUTH EVERY DAY      levonorgestrel (MIRENA) 20 MCG/24HR IUD 1 each by Intrauterine route once           STOP taking these medications       clonazePAM (KLONOPIN) 1 MG tablet Comments:   Reason for Stopping:          cloNIDine (CATAPRES) 0.1 MG tablet Comments:   Reason for Stopping:         lisdexamfetamine (VYVANSE) 50 MG capsule Comments:   Reason for Stopping:                    Psychiatric Examination:   Appearance:  awake, alert and adequately groomed  Attitude:  cooperative  Eye Contact:  good  Mood:  better  Affect:  appropriate and in normal range  Speech:  normal prosody  Psychomotor Behavior:  no evidence of tardive dyskinesia, dystonia, or tics  Thought Process:  goal oriented  Associations:  no loose associations  Thought Content:  no evidence of suicidal ideation or homicidal ideation, no auditory hallucinations present, and no visual hallucinations present  Insight:  fair  Judgment:  fair  Oriented to:  time, person, and place  Attention Span and Concentration:  intact  Recent and Remote Memory:  intact  Language: Able to name objects, Able to repeat phrases, and Able to read and write  Fund of Knowledge: appropriate  Muscle Strength and Tone: normal  Gait and Station: Normal         Discharge Plan:       Further instructions from your care team         Behavioral Discharge Planning and Instructions    Summary: You were admitted on 3/15/2025  due to Suicidal Ideations.  You were treated by Debra Naegele, APRN,CNP and discharged on 03/20/2025 from Young Adult to Home    Main Diagnosis:   Major depressive disorder, recurrent, moderate       Health Care Follow-up:     Appointment: Psychiatry   Date/time: Wednesday April 9th, 2025 @ 5:30 pm Virtual  Provider:  Anuradha GA CNP,RN  Address: [facility], [address] [place] [address], [place] [address]  Phone:  [phone]  Fax: [phone]  Note:   SP portal invite will be emailed if you dont receive please check spam folder. You are responsible to complete forms, and consents at least 24 hours in advance. Please call [phone] 24 hours in advance to confirm appointment. A credit card MUST be on file in order to be seen.   ** HUC to  fax AVS upon discharge, please.     Referrals:  [facility]:  [address] [place] [address]  Phone: [phone]  Fax: [phone]  Admission Specialist: Ashley Gilbert  Phone: [phone]  email: [email or web address]  [email or web address]  Groups:  1) Morning IOP + Mother's Group: Mon-Thurs 9am-noon  2) Morning In Person Group: Mon-Thurs 9am-noon  3) Morning Telehealth Group: Mon-Thurs 9am-noon  4) Afternoon Telehealth Group: Mon-Thurs 1-3pm  5) Evening Group: Mon-Thurs 5-8pm  IOP with Loging:   -32 beds     Transitions: (IOP with lodging- [facility] Women)  [address] [place] [address]  Phone: [phone]  Fax: [phone]  email: [email or web address]  [email or web address]  *Group Monday through Friday 8:00am to 12:00pm     [place]:  Phone: [phone]  Email: [email or web address]   Note: [facility] no longer has IOP with lodging, however, they have a residential option with immediate openings.      [facility] w/Lodging  [address] [place] [address]  Phone: [phone]  Fax: [phone]   Email: [email or web address]  [email or web address]  Note: There is a 3 month wait for the IOP with lodging. Residential is about a week wait as of 3/20/2025      Patient Navigation Hub:   [facility] work to be your point-of-contact for trustworthy and compassionate care from Inpatient services to [facility] Programmatic Care. We will provide resources and communication to help guide you into programmatic care. Ultimately, our goal is to be the one-stop-shop of communication, coordination, and support for your journey to programmatic care.    Phone: [phone]    Information will be faxed to your outpatient providers to ensure a healthy continuity of care for you.     Attend all scheduled appointments with your outpatient providers. Call at least 24 hours in advance if you  need to reschedule an appointment to ensure continued access to your outpatient providers.     Major Treatments, Procedures and Findings:  You were provided with: a psychiatric assessment, assessed for medical stability, medication evaluation and/or management, group therapy, individual therapy, CD evaluation/assessment, and milieu management    Symptoms to Report: feeling more aggressive, increased confusion, losing more sleep, mood getting worse, or thoughts of suicide    Early warning signs can include: increased depression or anxiety sleep disturbances increased thoughts or behaviors of suicide or self-harm  increased unusual thinking, such as paranoia or hearing voices    Safety and Wellness:  Take all medicines as directed.  Make no changes unless your doctor suggests them.      Follow treatment recommendations.  Refrain from alcohol and non-prescribed drugs.  Ask your support system to help you reduce your access to items that could harm yourself or others. If there is a concern for safety, call 911.    Resources:   Mental Health Crisis Resources  Throughout [place]: call **CRISIS (**930387)  Crisis Text Line: is available for free, 24/7 by texting [place] to 838450  Suicide Awareness Voices of Education (SAVE) (www.save.org): 370-995-RLZS (0010)  The National Suicide Prevention Lifeline is now: 988 Suicide and Crisis Lifeline. Call 988 anytime.  National [place] on Mental Illness (www.[place].marcus.org): 428.726.9832 or 248-268-7733.  Jtbs7hich: text the word LIFE to 56884 for immediate support and crisis intervention  Mental Health Consumer/Survivor Network of [place] (www.mhcsn.net): 770.946.9346 or 962-450-1605  Mental Health Association of [place] (www.mentalhealth.org): 984.685.6981 or 464-251-9344  Peer Support Connection [place] Warmline (PSC) 1-494.676.6065 Available from 5pm - 9am (7 days a week/365 days a year)  Call or Text 988, [facility] 2-509-511-4772 Community Outreach for Psych Emergencies, or [place]  [facility] 3-772-774-2754      General Medication Instructions:   See your medication sheet(s) for instructions.   Take all medicines as directed.  Make no changes unless your doctor suggests them.   Go to all your doctor visits.  Be sure to have all your required lab tests. This way, your medicines can be refilled on time.  Do not use any drugs not prescribed by your doctor.  Avoid alcohol.    Advance Directives:   Scanned document on file with [employer]? No scanned doc  Is document scanned? Pt unable to confirm  Honoring Choices Your Rights Handout: Minor - N/A  Was more information offered? Pt declined    The Treatment team has appreciated the opportunity to work with you. If you have any questions or concerns about your recent admission, you can contact the unit which can receive your call 24 hours a day, 7 days a week. They will be able to get in touch with a Provider if needed. The unit number is [phone] .             Attestation:  The patient has been seen and evaluated by me,  Debra A. Naegele, APRN CNS on 3/20/2025  Discharge summary time > 30 minutes

## 2025-03-20 NOTE — PLAN OF CARE
Team Note Due:  Monday  Assessment/Intervention/Current Symptoms and Care Coordination  Chart review and met with team, discussed pt progress, symptomology, and response to treatment.  Discussed the discharge plan and any potential impediments to discharge.    CTC make referrals to IOP with lodging programs.     CTC met with pt to update them on referrals. Pt stated she tried calling programs and that two of the programs are full and the other two programs did not answer. Pt asked about discharging and CTC reminded them that there is a care conference for 12pm to discuss discharge.     CTC received a call from [employer] [facility] stating that original fax was unreadable. CTC resent referral.     CTC participated in care conference with [facility] provider, pt, and pt's mother. There was discussion about referrals and discharge. It was decided that pt would discharge today, 3/20, to go to [place] and finish packing up belongings from a rental. Pt stated she would follow up with referrals upon returning.     [facility] called referrals that were made to inform them of pt's discharge and to follow up via the contact info on the CD assessment.     [facility] worked with care coordinator to get pt scheduled with follow up medication management.     Discharge Plan or Goal      Dispo Plan: Home with [facility] programing referrals   Discharge Date/ time: 3/20   Transportation: Mom to   AVS Completed: Yes [x] No[]  Provisional Discharge: Yes[] No[x]    Barriers to Discharge       Referral Status  Substance Use Programming  [facility]    Legal Status  Patient is voluntary        Contacts:   CHIDI signed for Pt's mom on 3/18/25   Abigail Rosas [phone]      Upcoming Meetings and Dates/Important Information and next steps:

## 2025-03-20 NOTE — PLAN OF CARE
Care Coordinator Note(s):    Care Request(s):   Psychiatry   Preferences: Time Frame: 3 Weeks, Virtual, Female Provider  Notes: Pt is discharging today at 3:30pm and will need follow up med management. [email or web address]      Care Outcome(s):    CC Progress Note(s)/ Documentation:      Appointment: Psychiatry   Date/time: Wednesday April 9th, 2025 @ 5:30 pm Virtual  Provider:  Anuradha Hernandez MSN CNP,RN  Address: [address], [place] [address]  Phone:  [phone]  Fax: [phone]  Note:   SP portal invite will be emailed if you dont receive please check spam folder. You are responsible to complete forms, and consents at least 24 hours in advance. Please call [phone] 24 hours in advance to confirm appointment. A credit card MUST be on file in order to be seen.      -Abigail Ojeda  Adult Behavioral Health Care Coordinator

## 2025-03-20 NOTE — PLAN OF CARE
BEH IP Unit Acuity Rating Score (UARS)  Patient is given one point for every criteria they meet.    CRITERIA SCORING   On a 72 hour hold, court hold, committed, stay of commitment, or revocation. 0    Patient LOS on BEH unit exceeds 20 days. 0  LOS: 5   Patient under guardianship, 55+, otherwise medically complex, or under age 11. 0   Suicide ideation without relief of precipitating factors. 0   Current plan for suicide. 0   Current plan for homicide. 0   Imminent risk or actual attempt to seriously harm another without relief of factors precipitating the attempt. 0   Severe dysfunction in daily living (ex: complete neglect for self care, extreme disruption in vegetative function, extreme deterioration in social interactions). 1   Recent (last 7 days) or current physical aggression in the ED or on unit. 0   Restraints or seclusion episode in past 72 hours. 0   Recent (last 7 days) or current verbal aggression, agitation, yelling, etc., while in the ED or unit. 0   Active psychosis. 0   Need for constant or near constant redirection (from leaving, from others, etc).  0   Intrusive or disruptive behaviors. 0   Patient requires 3 or more hours of individualized nursing care per 8-hour shift (i.e. for ADLs, meds, therapeutic interventions). 0   TOTAL 1

## 2025-03-20 NOTE — PLAN OF CARE
Rehab Group    Start time: 1115  End time: 1200  Patient time total: 30 minutes    attended partial group    #5 attended   Group Type: occupational therapy   Group Topic Covered: balanced lifestyle, cognitive activities, coping skills, healthy leisure time, problem solving, and social skills       Group Session Detail:  OT CLINIC     Patient Response/Contribution:  cooperative with task, organized, socially appropriate, safe use of materials/supplies, and actively engaged       Patient Detail:    Occupational therapy clinic to facilitate coping skill exploration, creative expression within personally meaningful activities, and clinical observation of social, cognitive, and kinesthetic performance skills. Pt response: Pt presented with neutral affect, calm mood. IND to initiate, gather materials, sequence, and adjust to workspace demands as needed for a structured, creative expressive task. Pt engaged socially with the nursing students observing group. Demonstrated fair task focus. Elected to leave group early without notification and did not return.         [id number] OT Group (2 or more in attendance)      Patient Active Problem List   Diagnosis    IUD (intrauterine device) in place    Depression    MARIVEL (generalized anxiety disorder)    Polysubstance abuse (H)    Moderate major depression (H)    Nasal obstruction    Closed fracture of nasal bone, initial encounter    Deviated nasal septum    Hypertrophy of both inferior nasal turbinates    History of fracture of nasal bone    Acquired nasal deformity    Severe episode of recurrent major depressive disorder, without psychotic features (H)    Generalized anxiety disorder    Intentional benzodiazepine overdose (H)    Suicide attempt (H)

## 2025-03-20 NOTE — PLAN OF CARE
Goal Outcome Evaluation:       Pt going home with mother and pt denied all mental health psych symptoms and committed for safety. Pt stated she does not have access to guns or dangerous weapons and her mother is source of support. Pt was picked up by mother at 1545 and pt belongings returned to pt.

## 2025-03-20 NOTE — PROGRESS NOTES
Rehab Group     Start time: 1020  End time: 1105  Patient time total: 40 minutes     attended partial group     #6 attended   Group Type: music   Group Topic Covered: balanced lifestyle, emotional regulation, healthy leisure time, sensory intervention, and social skills      Group Session Detail:  Music Therapy intervention of Rap & Hip Hop Music Bingo       Patient Response/Contribution:  positive affect, cooperative with task, attentive, and actively engaged      Patient Detail: Pt response was engaged and cooperative, with a warm affect  Benefits pt received from session were focusing, memory recall, positive distraction, emotional containment and social cohesion.          Activity Therapy Per 15 min ()    Patient Active Problem List   Diagnosis    IUD (intrauterine device) in place    Depression    MARIVEL (generalized anxiety disorder)    Polysubstance abuse (H)    Moderate major depression (H)    Nasal obstruction    Closed fracture of nasal bone, initial encounter    Deviated nasal septum    Hypertrophy of both inferior nasal turbinates    History of fracture of nasal bone    Acquired nasal deformity    Severe episode of recurrent major depressive disorder, without psychotic features (H)    Generalized anxiety disorder    Intentional benzodiazepine overdose (H)    Suicide attempt (H)

## 2025-03-20 NOTE — DISCHARGE INSTRUCTIONS
Behavioral Discharge Planning and Instructions    Summary: You were admitted on 3/15/2025  due to Suicidal Ideations.  You were treated by Debra Naegele, APRN,CNP and discharged on 03/20/2025 from [facility] Adult [facility] Home    Main Diagnosis:   Major depressive disorder, recurrent, moderate       Health Care Follow-up:     Appointment: Psychiatry   Date/time: Wednesday April 9th, 2025 @ 5:30 pm Virtual  Provider:  Anuradha AG CNP,RN  Address: [facility], [address] [place] [address], [facility] [address], [place] [address]  Phone:  [phone]  Fax: [phone]  Note:   SP portal invite will be emailed if you dont receive please check spam folder. You are responsible to complete forms, and consents at least 24 hours in advance. Please call [phone] 24 hours in advance to confirm appointment. A credit card MUST be on file in order to be seen.   ** HUC to fax AVS upon discharge, please.     Referrals:  [facility]:  [address] [place] [address]  Phone: [phone]  Fax: [phone]  Admission Specialist: Ashley Gilbert  Phone: [phone]  email: [email or web address]  [email or web address]  Groups:  1) Morning IOP + Mother's Group: Mon-Thurs 9am-noon  2) Morning In Person Group: Mon-Thurs 9am-noon  3) Morning Telehealth Group: Mon-Thurs 9am-noon  4) Afternoon Telehealth Group: Mon-Thurs 1-3pm  5) Evening Group: Mon-Thurs 5-8pm  IOP with Loging:   -32 beds     Transitions: (IOP with lodging- Men & Women)  [address] [place] [address]  Phone: [phone]  Fax: [phone]  email: [email or web address]  [email or web address]  *Group Monday through Friday 8:00am to 12:00pm     [place]:  Phone: [phone]  Email: [email or web address]   Note: [facility] no longer has IOP with lodging, however, they have a residential option with immediate openings.      [facility] w/Lodging  [address] [place] [address]  Phone: [phone]  Fax: [phone]    Email: [email or web address]  [email or web address]  Note: There is a 3 month wait for the IOP with lodging. Residential is about a week wait as of 3/20/2025      Patient Navigation Hub:   [facility] work to be your point-of-contact for trustworthy and compassionate care from Inpatient services to [facility] Programmatic Care. We will provide resources and communication to help guide you into programmatic care. Ultimately, our goal is to be the one-stop-shop of communication, coordination, and support for your journey to programmatic care.    Phone: [phone]    Information will be faxed to your outpatient providers to ensure a healthy continuity of care for you.     Attend all scheduled appointments with your outpatient providers. Call at least 24 hours in advance if you need to reschedule an appointment to ensure continued access to your outpatient providers.     Major Treatments, Procedures and Findings:  You were provided with: a psychiatric assessment, assessed for medical stability, medication evaluation and/or management, group therapy, individual therapy, CD evaluation/assessment, and milieu management    Symptoms to Report: feeling more aggressive, increased confusion, losing more sleep, mood getting worse, or thoughts of suicide    Early warning signs can include: increased depression or anxiety sleep disturbances increased thoughts or behaviors of suicide or self-harm  increased unusual thinking, such as paranoia or hearing voices    Safety and Wellness:  Take all medicines as directed.  Make no changes unless your doctor suggests them.      Follow treatment recommendations.  Refrain from alcohol and non-prescribed drugs.  Ask your support system to help you reduce your access to items that could harm yourself or others. If there is a concern for safety, call 911.    Resources:   Mental Health Crisis  Resources  Throughout [place]: call **CRISIS (**036267)  Crisis Text Line: is available for free, 24/7 by texting [place] to 704467  Suicide Awareness Voices of Education (SAVE) (www.save.org): 461-984-FPTZ (8807)  The National Suicide Prevention Lifeline is now: 988 Suicide and Crisis Lifeline. Call 988 anytime.  National [place] on Mental Illness (www.[place].marcus.org): 751.437.3546 or 588-625-3291.  Hwct1ocst: text the word LIFE to 63135 for immediate support and crisis intervention  Mental Health Consumer/Survivor Network of [place] (www.mhcsn.net): 436.909.4437 or 859-108-5736  Mental Health Association of [place] (www.mentalhealth.org): 200.892.6135 or 560-944-1917  Peer Support Connection [place] Warmline (PSC) 1-199.308.1073 Available from 5pm - 9am (7 days a week/365 days a year)  Call or Text 988, [facility] 9-611-526-0847 Community Outreach for Psych Emergencies, or [facility] 1-364.372.4742      General Medication Instructions:   See your medication sheet(s) for instructions.   Take all medicines as directed.  Make no changes unless your doctor suggests them.   Go to all your doctor visits.  Be sure to have all your required lab tests. This way, your medicines can be refilled on time.  Do not use any drugs not prescribed by your doctor.  Avoid alcohol.    Advance Directives:   Scanned document on file with [facility]? No scanned doc  Is document scanned? Pt unable to confirm  Honoring Choices Your Rights Handout: Minor - N/A  Was more information offered? Pt declined    The Treatment team has appreciated the opportunity to work with you. If you have any questions or concerns about your recent admission, you can contact the unit which can receive your call 24 hours a day, 7 days a week. They will be able to get in touch with a Provider if needed. The unit number is [phone] .

## 2025-03-20 NOTE — PLAN OF CARE
Problem: Sleep Disturbance  Goal: Adequate Sleep/Rest  Outcome: Progressing   Goal Outcome Evaluation:    Patient slept for 7 hours during the night. No indication of pain or discomfort. Breathing noted even and unlabored.  No indication of pain or discomfort. No behavior at this time. Will continue to monitor.

## 2025-03-20 NOTE — PLAN OF CARE
Problem: Suicide Risk  Goal: Absence of Self-Harm  Outcome: Progressing     Problem: Adult Behavioral Health Plan of Care  Goal: Adheres to Safety Considerations for Self and Others  Intervention: Develop and Maintain Individualized Safety Plan  Recent Flowsheet Documentation  Taken 3/20/2025 1000 by Du Vera RN  Safety Measures: safety rounds completed   Goal Outcome Evaluation:         Patient had flat affect. Mood was calm. Was visible in the milieu and socialized with peers. Participated in group activities. Verbalized anxiety 6/10 and depression 3/10. Denied pain covid 19 symptoms, SI/HI/SIB/AH/VH, and contracted for safety. Was given Hydroxyzine 25 mg for anxiety, Nicotine gum 2 mg, and scheduled medications. Had good appetite. Patient will discharge today and mom will pick patient up at about 3 Pm. Discharge education given to patient and she verbalized good understanding. Evening shift notified. Will continue to monitor and assist patient.

## 2025-03-20 NOTE — PLAN OF CARE
Problem: Suicidal Behavior  Goal: Suicidal Behavior is Absent or Managed  Outcome: Progressing   Goal Outcome Evaluation:    /76 (BP Location: Right arm)   Pulse 81   Temp 98.2  F (36.8  C) (Temporal)   Resp 18   Wt 59.1 kg (130 lb 6.4 oz)   SpO2 97%   BMI 20.42 kg/m      At the start of the shift, pt reported being sad that she was still admitted in the hospital. She was notably tearful and expressed frustrations due to not being discharged home. She was educated on the discharge process and was encouraged to continue adhering to the plan of care as her mental health symptoms  progressively stabilized. Pt denied all mental health symptoms stating that she didn't think she had symptoms but was sad. As the shift progressed, she brightened up. She socialized with selected peers. She ate 100% off her dinner tray with adequate fluids intake. Pt was medication compliant, denied any medication adverse reactions. She requested and received prn nicotine gum every hour for smoking cessation. No behavior outbursts.

## 2025-03-24 ENCOUNTER — PATIENT OUTREACH (OUTPATIENT)
Dept: CARE COORDINATION | Facility: CLINIC | Age: 25
End: 2025-03-24
Payer: COMMERCIAL

## 2025-03-24 NOTE — PROGRESS NOTES
Connected Care Resource Center:   [facility] Contact  [facility]/Voicemail     Clinical Data: Post-Discharge Outreach     Outreach attempted x 2.  Left message on patient's voicemail, providing [facility]'s central phone number of [phone] ([phone]) for questions/concerns and/or to schedule an appt with an [facility] provider, if they do not have a PCP.      Plan:  [facility] will do no further outreaches at this time.       GRACY Kaiser  Connected Care Resource [place], [facility]    *Connected Care Resource Team does NOT follow patient ongoing. Referrals are identified based on internal discharge reports and the outreach is to ensure patient has an understanding of their discharge instructions.

## 2025-05-04 NOTE — TELEPHONE ENCOUNTER
FUTURE VISIT INFORMATION      FUTURE VISIT INFORMATION:    Date: 10/20/21    Time: 1:20 PM    [place] [facility]  REFERRAL INFORMATION:    Referring provider:  Dr. Sonido Gonzalez    Referring providers clinic:  [facility]    Reason for visit/diagnosis: Nasal Deformity, Deviated Nasal Septum    RECORDS REQUESTED FROM:       Clinic name Comments Records Status Imaging Status   [facility] 7/5/21 - ENT OV with Dr. Lisa Gardner 6/28/21 - PCC OV with BRENDAN Miles  12/4/20 - PCC OV with Dr. Kennedy Abarca 7/15/19 - P[facility] OV [facility]    [facility] - Imaging 11/11/18 - XR Mandible Epic PACs                          11

## 2025-08-02 ENCOUNTER — HEALTH MAINTENANCE LETTER (OUTPATIENT)
Age: 25
End: 2025-08-02

## 2025-08-18 ENCOUNTER — OFFICE VISIT (OUTPATIENT)
Dept: FAMILY MEDICINE | Facility: CLINIC | Age: 25
End: 2025-08-18
Payer: COMMERCIAL

## 2025-08-18 VITALS
HEART RATE: 103 BPM | HEIGHT: 67 IN | WEIGHT: 168.4 LBS | SYSTOLIC BLOOD PRESSURE: 100 MMHG | RESPIRATION RATE: 18 BRPM | DIASTOLIC BLOOD PRESSURE: 69 MMHG | BODY MASS INDEX: 26.43 KG/M2 | TEMPERATURE: 97.2 F | OXYGEN SATURATION: 98 %

## 2025-08-18 DIAGNOSIS — M25.50 MULTIPLE JOINT PAIN: ICD-10-CM

## 2025-08-18 DIAGNOSIS — Z00.00 ROUTINE GENERAL MEDICAL EXAMINATION AT A HEALTH CARE FACILITY: Primary | ICD-10-CM

## 2025-08-18 DIAGNOSIS — F31.9 BIPOLAR AFFECTIVE DISORDER, REMISSION STATUS UNSPECIFIED (H): ICD-10-CM

## 2025-08-18 DIAGNOSIS — Z11.3 SCREENING FOR STDS (SEXUALLY TRANSMITTED DISEASES): ICD-10-CM

## 2025-08-18 DIAGNOSIS — R21 SKIN RASH: ICD-10-CM

## 2025-08-18 DIAGNOSIS — Z30.09 BIRTH CONTROL COUNSELING: ICD-10-CM

## 2025-08-18 DIAGNOSIS — M95.0 ACQUIRED NASAL DEFORMITY: ICD-10-CM

## 2025-08-18 LAB
ALBUMIN SERPL BCG-MCNC: 4.2 G/DL (ref 3.5–5.2)
ALP SERPL-CCNC: 109 U/L (ref 40–150)
ALT SERPL W P-5'-P-CCNC: 17 U/L (ref 0–50)
ANION GAP SERPL CALCULATED.3IONS-SCNC: 11 MMOL/L (ref 7–15)
AST SERPL W P-5'-P-CCNC: 28 U/L (ref 0–45)
BILIRUB SERPL-MCNC: 0.4 MG/DL
BUN SERPL-MCNC: 12.9 MG/DL (ref 6–20)
CALCIUM SERPL-MCNC: 9.3 MG/DL (ref 8.8–10.4)
CHLORIDE SERPL-SCNC: 104 MMOL/L (ref 98–107)
CREAT SERPL-MCNC: 0.9 MG/DL (ref 0.51–0.95)
EGFRCR SERPLBLD CKD-EPI 2021: >90 ML/MIN/1.73M2
ERYTHROCYTE [DISTWIDTH] IN BLOOD BY AUTOMATED COUNT: 12 % (ref 10–15)
GLUCOSE SERPL-MCNC: 85 MG/DL (ref 70–99)
HCG UR QL: NEGATIVE
HCO3 SERPL-SCNC: 25 MMOL/L (ref 22–29)
HCT VFR BLD AUTO: 40.4 % (ref 35–47)
HCV AB SERPL QL IA: NONREACTIVE
HGB BLD-MCNC: 13.6 G/DL (ref 11.7–15.7)
HIV 1+2 AB+HIV1 P24 AG SERPL QL IA: NONREACTIVE
MCH RBC QN AUTO: 29.2 PG (ref 26.5–33)
MCHC RBC AUTO-ENTMCNC: 33.7 G/DL (ref 31.5–36.5)
MCV RBC AUTO: 86.7 FL (ref 78–100)
PLATELET # BLD AUTO: 232 10E3/UL (ref 150–450)
POTASSIUM SERPL-SCNC: 4.3 MMOL/L (ref 3.4–5.3)
PROT SERPL-MCNC: 6.6 G/DL (ref 6.4–8.3)
RBC # BLD AUTO: 4.66 10E6/UL (ref 3.8–5.2)
RHEUMATOID FACT SERPL-ACNC: <10 IU/ML
SODIUM SERPL-SCNC: 140 MMOL/L (ref 135–145)
T PALLIDUM AB SER QL: NONREACTIVE
TSH SERPL DL<=0.005 MIU/L-ACNC: 1.09 UIU/ML (ref 0.3–4.2)
WBC # BLD AUTO: 3.61 10E3/UL (ref 4–11)

## 2025-08-18 PROCEDURE — 3078F DIAST BP <80 MM HG: CPT | Performed by: FAMILY MEDICINE

## 2025-08-18 PROCEDURE — 87389 HIV-1 AG W/HIV-1&-2 AB AG IA: CPT | Performed by: FAMILY MEDICINE

## 2025-08-18 PROCEDURE — 84443 ASSAY THYROID STIM HORMONE: CPT | Performed by: FAMILY MEDICINE

## 2025-08-18 PROCEDURE — 86038 ANTINUCLEAR ANTIBODIES: CPT | Performed by: FAMILY MEDICINE

## 2025-08-18 PROCEDURE — 86431 RHEUMATOID FACTOR QUANT: CPT | Performed by: FAMILY MEDICINE

## 2025-08-18 PROCEDURE — 86780 TREPONEMA PALLIDUM: CPT | Performed by: FAMILY MEDICINE

## 2025-08-18 PROCEDURE — 87491 CHLMYD TRACH DNA AMP PROBE: CPT | Performed by: FAMILY MEDICINE

## 2025-08-18 PROCEDURE — 80053 COMPREHEN METABOLIC PANEL: CPT | Performed by: FAMILY MEDICINE

## 2025-08-18 PROCEDURE — 36415 COLL VENOUS BLD VENIPUNCTURE: CPT | Performed by: FAMILY MEDICINE

## 2025-08-18 PROCEDURE — 86039 ANTINUCLEAR ANTIBODIES (ANA): CPT | Performed by: FAMILY MEDICINE

## 2025-08-18 PROCEDURE — 87591 N.GONORRHOEAE DNA AMP PROB: CPT | Performed by: FAMILY MEDICINE

## 2025-08-18 PROCEDURE — 81025 URINE PREGNANCY TEST: CPT | Performed by: FAMILY MEDICINE

## 2025-08-18 PROCEDURE — 3074F SYST BP LT 130 MM HG: CPT | Performed by: FAMILY MEDICINE

## 2025-08-18 PROCEDURE — 99395 PREV VISIT EST AGE 18-39: CPT | Performed by: FAMILY MEDICINE

## 2025-08-18 PROCEDURE — 85027 COMPLETE CBC AUTOMATED: CPT | Performed by: FAMILY MEDICINE

## 2025-08-18 PROCEDURE — 86803 HEPATITIS C AB TEST: CPT | Performed by: FAMILY MEDICINE

## 2025-08-18 SDOH — HEALTH STABILITY: PHYSICAL HEALTH: ON AVERAGE, HOW MANY DAYS PER WEEK DO YOU ENGAGE IN MODERATE TO STRENUOUS EXERCISE (LIKE A BRISK WALK)?: 4 DAYS

## 2025-08-18 ASSESSMENT — SOCIAL DETERMINANTS OF HEALTH (SDOH): HOW OFTEN DO YOU GET TOGETHER WITH FRIENDS OR RELATIVES?: MORE THAN THREE TIMES A WEEK

## 2025-08-18 ASSESSMENT — PATIENT HEALTH QUESTIONNAIRE - PHQ9
SUM OF ALL RESPONSES TO PHQ QUESTIONS 1-9: 13
10. IF YOU CHECKED OFF ANY PROBLEMS, HOW DIFFICULT HAVE THESE PROBLEMS MADE IT FOR YOU TO DO YOUR WORK, TAKE CARE OF THINGS AT HOME, OR GET ALONG WITH OTHER PEOPLE: SOMEWHAT DIFFICULT
SUM OF ALL RESPONSES TO PHQ QUESTIONS 1-9: 13

## 2025-08-19 ENCOUNTER — PATIENT OUTREACH (OUTPATIENT)
Dept: CARE COORDINATION | Facility: CLINIC | Age: 25
End: 2025-08-19
Payer: COMMERCIAL

## 2025-08-19 LAB
ANA PAT SER IF-IMP: ABNORMAL
ANA SER QL IF: POSITIVE
ANA TITR SER IF: ABNORMAL {TITER}
C TRACH DNA SPEC QL PROBE+SIG AMP: NEGATIVE
N GONORRHOEA DNA SPEC QL NAA+PROBE: NEGATIVE
SPECIMEN TYPE: NORMAL

## 2025-08-21 ENCOUNTER — PATIENT OUTREACH (OUTPATIENT)
Dept: CARE COORDINATION | Facility: CLINIC | Age: 25
End: 2025-08-21
Payer: COMMERCIAL